# Patient Record
Sex: MALE | Race: WHITE | NOT HISPANIC OR LATINO | Employment: OTHER | URBAN - METROPOLITAN AREA
[De-identification: names, ages, dates, MRNs, and addresses within clinical notes are randomized per-mention and may not be internally consistent; named-entity substitution may affect disease eponyms.]

---

## 2023-10-24 DIAGNOSIS — I48.91 ATRIAL FIBRILLATION, UNSPECIFIED TYPE (MULTI): Primary | ICD-10-CM

## 2023-10-24 RX ORDER — METOPROLOL TARTRATE 25 MG/1
1 TABLET, FILM COATED ORAL 2 TIMES DAILY
COMMUNITY
End: 2023-10-24 | Stop reason: SDUPTHER

## 2023-10-24 RX ORDER — METOPROLOL TARTRATE 25 MG/1
25 TABLET, FILM COATED ORAL 2 TIMES DAILY
Qty: 180 TABLET | Refills: 0 | Status: SHIPPED | OUTPATIENT
Start: 2023-10-24 | End: 2024-01-22 | Stop reason: SDUPTHER

## 2023-12-13 DIAGNOSIS — I10 PRIMARY HYPERTENSION: Primary | ICD-10-CM

## 2023-12-13 RX ORDER — LOSARTAN POTASSIUM 50 MG/1
50 TABLET ORAL DAILY
COMMUNITY
Start: 2022-06-15 | End: 2023-12-13 | Stop reason: SDUPTHER

## 2023-12-13 RX ORDER — LOSARTAN POTASSIUM 50 MG/1
50 TABLET ORAL DAILY
Qty: 90 TABLET | Refills: 3 | Status: SHIPPED | OUTPATIENT
Start: 2023-12-13

## 2024-01-22 DIAGNOSIS — I48.91 ATRIAL FIBRILLATION, UNSPECIFIED TYPE (MULTI): ICD-10-CM

## 2024-01-23 RX ORDER — METOPROLOL TARTRATE 25 MG/1
25 TABLET, FILM COATED ORAL 2 TIMES DAILY
Qty: 180 TABLET | Refills: 1 | Status: SHIPPED | OUTPATIENT
Start: 2024-01-23 | End: 2024-07-21

## 2024-06-24 ENCOUNTER — APPOINTMENT (OUTPATIENT)
Dept: CARDIOLOGY | Facility: CLINIC | Age: 79
End: 2024-06-24
Payer: MEDICARE

## 2024-07-01 ENCOUNTER — APPOINTMENT (OUTPATIENT)
Dept: CARDIOLOGY | Facility: CLINIC | Age: 79
End: 2024-07-01
Payer: MEDICARE

## 2024-07-01 VITALS
BODY MASS INDEX: 26.51 KG/M2 | HEIGHT: 69 IN | TEMPERATURE: 97.3 F | WEIGHT: 179 LBS | HEART RATE: 59 BPM | SYSTOLIC BLOOD PRESSURE: 114 MMHG | DIASTOLIC BLOOD PRESSURE: 64 MMHG

## 2024-07-01 DIAGNOSIS — I10 ESSENTIAL HYPERTENSION: ICD-10-CM

## 2024-07-01 DIAGNOSIS — E78.2 MIXED HYPERLIPIDEMIA: ICD-10-CM

## 2024-07-01 DIAGNOSIS — Z95.1 S/P CABG X 3: ICD-10-CM

## 2024-07-01 DIAGNOSIS — I25.10 ATHEROSCLEROSIS OF NATIVE CORONARY ARTERY OF NATIVE HEART WITHOUT ANGINA PECTORIS: Primary | ICD-10-CM

## 2024-07-01 PROBLEM — G89.29 CHRONIC BACK PAIN: Status: ACTIVE | Noted: 2024-07-01

## 2024-07-01 PROBLEM — N18.31 STAGE 3A CHRONIC KIDNEY DISEASE (MULTI): Status: ACTIVE | Noted: 2023-07-13

## 2024-07-01 PROBLEM — F32.1 MAJOR DEPRESSIVE DISORDER, SINGLE EPISODE, MODERATE (MULTI): Status: ACTIVE | Noted: 2023-07-13

## 2024-07-01 PROBLEM — K21.9 GERD WITHOUT ESOPHAGITIS: Status: ACTIVE | Noted: 2023-07-13

## 2024-07-01 PROBLEM — E11.42 DIABETIC PERIPHERAL NEUROPATHY ASSOCIATED WITH TYPE 2 DIABETES MELLITUS (MULTI): Status: ACTIVE | Noted: 2023-07-13

## 2024-07-01 PROBLEM — M47.817 LUMBOSACRAL SPONDYLOSIS WITHOUT MYELOPATHY: Status: ACTIVE | Noted: 2022-09-06

## 2024-07-01 PROBLEM — M48.062 SPINAL STENOSIS, LUMBAR REGION, WITH NEUROGENIC CLAUDICATION: Status: ACTIVE | Noted: 2024-06-11

## 2024-07-01 PROBLEM — N40.0 HYPERPLASIA OF PROSTATE WITHOUT LOWER URINARY TRACT SYMPTOMS (LUTS): Status: ACTIVE | Noted: 2023-07-13

## 2024-07-01 PROBLEM — M54.9 CHRONIC BACK PAIN: Status: ACTIVE | Noted: 2024-07-01

## 2024-07-01 PROBLEM — G25.0 ESSENTIAL TREMOR: Status: ACTIVE | Noted: 2023-07-13

## 2024-07-01 PROCEDURE — 4004F PT TOBACCO SCREEN RCVD TLK: CPT | Performed by: INTERNAL MEDICINE

## 2024-07-01 PROCEDURE — 99213 OFFICE O/P EST LOW 20 MIN: CPT | Performed by: INTERNAL MEDICINE

## 2024-07-01 PROCEDURE — 3078F DIAST BP <80 MM HG: CPT | Performed by: INTERNAL MEDICINE

## 2024-07-01 PROCEDURE — 93000 ELECTROCARDIOGRAM COMPLETE: CPT | Performed by: INTERNAL MEDICINE

## 2024-07-01 PROCEDURE — 1159F MED LIST DOCD IN RCRD: CPT | Performed by: INTERNAL MEDICINE

## 2024-07-01 PROCEDURE — 1157F ADVNC CARE PLAN IN RCRD: CPT | Performed by: INTERNAL MEDICINE

## 2024-07-01 PROCEDURE — 1160F RVW MEDS BY RX/DR IN RCRD: CPT | Performed by: INTERNAL MEDICINE

## 2024-07-01 PROCEDURE — 3074F SYST BP LT 130 MM HG: CPT | Performed by: INTERNAL MEDICINE

## 2024-07-01 RX ORDER — ATORVASTATIN CALCIUM 40 MG/1
40 TABLET, FILM COATED ORAL DAILY
COMMUNITY

## 2024-07-01 RX ORDER — NITROGLYCERIN 0.4 MG/1
0.4 TABLET SUBLINGUAL EVERY 5 MIN PRN
COMMUNITY

## 2024-07-01 RX ORDER — NAPROXEN SODIUM 220 MG/1
81 TABLET, FILM COATED ORAL
COMMUNITY
Start: 2024-05-06

## 2024-07-01 RX ORDER — LANOLIN ALCOHOL/MO/W.PET/CERES
400 CREAM (GRAM) TOPICAL DAILY
COMMUNITY

## 2024-07-01 RX ORDER — METFORMIN HYDROCHLORIDE 1000 MG/1
1000 TABLET ORAL
COMMUNITY
Start: 2024-05-06

## 2024-07-01 RX ORDER — GABAPENTIN 600 MG/1
1200 TABLET ORAL 2 TIMES DAILY
COMMUNITY
Start: 2024-05-06 | End: 2025-05-06

## 2024-07-01 RX ORDER — FAMOTIDINE 40 MG/1
40 TABLET, FILM COATED ORAL DAILY
COMMUNITY
Start: 2024-05-06

## 2024-07-01 RX ORDER — MULTIVIT-MIN/IRON FUM/FOLIC AC 7.5 MG-4
1 TABLET ORAL
COMMUNITY
Start: 2024-05-06 | End: 2025-05-06

## 2024-07-01 RX ORDER — MIRTAZAPINE 30 MG/1
30 TABLET, FILM COATED ORAL NIGHTLY
COMMUNITY
Start: 2024-05-06

## 2024-07-01 ASSESSMENT — ENCOUNTER SYMPTOMS
RESPIRATORY NEGATIVE: 1
CARDIOVASCULAR NEGATIVE: 1
PSYCHIATRIC NEGATIVE: 1
NEUROLOGICAL NEGATIVE: 1
BACK PAIN: 1
DIARRHEA: 1

## 2024-07-01 NOTE — PROGRESS NOTES
Patient:  Edis Vogt  YOB: 1945  MRN: [id number]       Chief Complaint/Active Symptoms:       Edis Vogt is a 79 y.o. male who returns today for cardiac follow-up.    Here for annual follow-up. Having issues with back pain and had some injections about 3 weeks ago and doing better.     No chest pain or discomfort, shortness of breath, orthopnea, PND, or edema. No palpitations, dizziness or lightheadedness.     No questions or concerns today.     Review of Systems   Respiratory: Negative.     Cardiovascular: Negative.    Gastrointestinal:  Positive for diarrhea (chronic diarrhea).   Genitourinary: Negative.    Musculoskeletal:  Positive for back pain.   Neurological: Negative.    Psychiatric/Behavioral: Negative.     All other systems reviewed and are negative.      Objective:     Vitals:    07/01/24 1152   BP: 114/64   Pulse: 59   Temp: 36.3 °C (97.3 °F)       Vitals:    07/01/24 1152   Weight: 81.2 kg (179 lb)       Allergies:     No Known Allergies       Medications:     Current Outpatient Medications   Medication Instructions    aspirin 81 mg, oral, Daily RT    atorvastatin (LIPITOR) 40 mg, oral, Daily    famotidine (PEPCID) 40 mg, oral, Daily    gabapentin (NEURONTIN) 1,200 mg, oral, 2 times daily    losartan (COZAAR) 50 mg, oral, Daily    magnesium oxide (MAG-OX) 400 mg, oral, Daily    metFORMIN (GLUCOPHAGE) 1,000 mg, oral, 2 times daily (morning and late afternoon)    metoprolol tartrate (LOPRESSOR) 25 mg, oral, 2 times daily    mirtazapine (REMERON) 30 mg, oral, Nightly    multivitamin with minerals tablet 1 tablet, oral, Daily RT    nitroglycerin (NITROSTAT) 0.4 mg, sublingual, Every 5 min PRN    psyllium (Metamucil) 3.4 gram packet 1 packet, oral, Daily       Physical Examination:   GENERAL:  Well developed, well nourished, in no acute distress.  HEENT: NC AT, EOMI with anicteric sclera  NECK:  reduced ROM, no JVD, no bruit.  CHEST:  Symmetric and nontender.  LUNGS:  Clear to  auscultation bilaterally, normal respiratory effort.  HEART:  PMI is nondisplaced. RRR with normal S1 and S2, no S3, no mumur or rub. No carotid or abdominal bruits  ABDOMEN: Soft, NT, ND without palpable organomegaly or bruits  EXTREMITIES:  Warm with good color, no clubbing or cyanosis.  There is no edema noted.  PERIPHERAL VASCULAR:  Pulses present and equally palpable; 2+ throughout, DP 1+  MUSCULOSKELETAL: OA changes  NEURO/PSYCH:  Alert and oriented times three with approppriate behavior and responses. Nonfocal motor examination with normal gait and ambulation  Lymph: No significant palpable lymphadenopathy  Skin: no rash or lesions on exposed skin or reported. Thickened toenails    Lab:     CBC:   Lab Results   Component Value Date    WBC 5.5 06/08/2022    RBC 3.89 (L) 06/08/2022    HGB 11.8 (L) 06/08/2022    HCT 34.3 (L) 06/08/2022     06/08/2022        CMP:    Lab Results   Component Value Date     06/08/2022    K 3.7 06/08/2022     06/08/2022    CO2 22 06/08/2022    BUN 17 06/08/2022    CREATININE 1.37 (H) 06/08/2022    GLUCOSE 160 (H) 06/08/2022    CALCIUM 8.7 06/08/2022       Magnesium:    Lab Results   Component Value Date    MG 1.70 06/07/2022       Lipid Profile:    Lab Results   Component Value Date    TRIG 67 01/11/2022    HDL 32.0 (A) 01/11/2022       TSH:    Lab Results   Component Value Date    TSH 2.71 06/07/2022       BNP:   Lab Results   Component Value Date    BNP 24 06/07/2022        PT/INR:    Lab Results   Component Value Date    PROTIME 13.8 (H) 06/07/2022    INR 1.2 (H) 06/07/2022       HgBA1c:    Lab Results   Component Value Date    HGBA1C 6.6 (A) 01/11/2022       BMP:  Lab Results   Component Value Date     06/08/2022     06/07/2022     01/11/2022    K 3.7 06/08/2022    K 4.3 06/07/2022    K 4.9 01/11/2022     06/08/2022     06/07/2022     01/11/2022    CO2 22 06/08/2022    CO2 26 06/07/2022    CO2 31 01/11/2022    BUN 17  06/08/2022    BUN 22 06/07/2022    BUN 21 01/11/2022    CREATININE 1.37 (H) 06/08/2022    CREATININE 1.63 (H) 06/07/2022    CREATININE 1.59 (H) 01/11/2022       Cardiac Enzymes:    Lab Results   Component Value Date    TROPHS 7 06/07/2022    TROPHS 7 06/07/2022    TROPHS 7 06/07/2022       Hepatic Function Panel:    Lab Results   Component Value Date    ALKPHOS 61 06/07/2022    ALT 15 06/07/2022    AST 20 06/07/2022    PROT 7.5 06/07/2022    BILITOT 0.8 06/07/2022     Patient with recent labs via Epic Chart and [facility] that show mild CKD that is stable, no anemia, and LDL cholesterol < 55.     Diagnostic Studies:     No echocardiogram results found for the past 12 months    No nuclear medicine results found for the past 12 months  Last nuclear stress 7/2020 normal perfusion but abnormal EKG, EF 73%  No valid procedures specified.    EKG:   SB, low voltage QRS, delayed R wave progression.     Radiology:     No orders to display         ASSESSMENT     Problem List Items Addressed This Visit       Essential hypertension    Relevant Orders    ECG 12 lead (Clinic Performed) (Completed)    Atherosclerosis of coronary artery of native heart without angina pectoris - Primary    Relevant Medications    nitroglycerin (Nitrostat) 0.4 mg SL tablet    Mixed hyperlipidemia    S/P CABG x 3       PLAN     1.  Coronary artery disease stable without angina pectoris and history of coronary artery bypass surgery.  Patient is stable without angina with a low risk nuclear stress test from 4 years ago.  Will continue conservative medical therapy and risk factor modification.  2.  Benign essential hypertension.  Blood pressures are well-controlled on his current medical regimen.  3.  Mixed hyperlipidemia.  LDL cholesterol is at goal and he is tolerating his medications without any problems.  Would continue the same regimen.  4.  Tobacco weight status.  The patient is a non-smoker and is close to his ideal body weight.    I will see the  patient in follow-up in a year sooner if he has any problems or concerns.  In the absence of any symptoms we will consider stress test next year for risk stratification.

## 2024-07-16 DIAGNOSIS — I48.91 ATRIAL FIBRILLATION, UNSPECIFIED TYPE (MULTI): ICD-10-CM

## 2024-07-16 RX ORDER — METOPROLOL TARTRATE 25 MG/1
25 TABLET, FILM COATED ORAL 2 TIMES DAILY
Qty: 180 TABLET | Refills: 3 | Status: SHIPPED | OUTPATIENT
Start: 2024-07-16 | End: 2025-07-16

## 2024-10-23 ENCOUNTER — APPOINTMENT (OUTPATIENT)
Dept: CARDIOLOGY | Facility: HOSPITAL | Age: 79
End: 2024-10-23
Payer: MEDICARE

## 2024-10-23 ENCOUNTER — APPOINTMENT (OUTPATIENT)
Dept: RADIOLOGY | Facility: HOSPITAL | Age: 79
End: 2024-10-23
Payer: MEDICARE

## 2024-10-23 ENCOUNTER — HOSPITAL ENCOUNTER (OUTPATIENT)
Facility: HOSPITAL | Age: 79
Setting detail: OBSERVATION
Discharge: HOME | End: 2024-10-25
Attending: EMERGENCY MEDICINE | Admitting: STUDENT IN AN ORGANIZED HEALTH CARE EDUCATION/TRAINING PROGRAM
Payer: MEDICARE

## 2024-10-23 DIAGNOSIS — R40.4 TRANSIENT ALTERATION OF AWARENESS: Primary | ICD-10-CM

## 2024-10-23 DIAGNOSIS — Z95.1 S/P CABG X 3: ICD-10-CM

## 2024-10-23 DIAGNOSIS — G45.9 TRANSIENT CEREBRAL ISCHEMIA, UNSPECIFIED TYPE: ICD-10-CM

## 2024-10-23 DIAGNOSIS — R35.0 URINARY FREQUENCY: ICD-10-CM

## 2024-10-23 DIAGNOSIS — R41.0 DISORIENTATION: ICD-10-CM

## 2024-10-23 PROBLEM — R41.82 ALTERED MENTAL STATUS: Status: ACTIVE | Noted: 2024-10-23

## 2024-10-23 LAB
ALBUMIN SERPL BCP-MCNC: 3.9 G/DL (ref 3.4–5)
ALP SERPL-CCNC: 78 U/L (ref 33–136)
ALT SERPL W P-5'-P-CCNC: 17 U/L (ref 10–52)
AMPHETAMINES UR QL SCN: NORMAL
ANION GAP SERPL CALC-SCNC: 15 MMOL/L (ref 10–20)
APPEARANCE UR: CLEAR
AST SERPL W P-5'-P-CCNC: 18 U/L (ref 9–39)
BARBITURATES UR QL SCN: NORMAL
BASOPHILS # BLD AUTO: 0.03 X10*3/UL (ref 0–0.1)
BASOPHILS NFR BLD AUTO: 0.5 %
BENZODIAZ UR QL SCN: NORMAL
BILIRUB SERPL-MCNC: 0.8 MG/DL (ref 0–1.2)
BILIRUB UR STRIP.AUTO-MCNC: NEGATIVE MG/DL
BNP SERPL-MCNC: 116 PG/ML (ref 0–99)
BUN SERPL-MCNC: 14 MG/DL (ref 6–23)
BZE UR QL SCN: NORMAL
CALCIUM SERPL-MCNC: 9.2 MG/DL (ref 8.6–10.3)
CANNABINOIDS UR QL SCN: NORMAL
CARDIAC TROPONIN I PNL SERPL HS: 5 NG/L (ref 0–20)
CARDIAC TROPONIN I PNL SERPL HS: 6 NG/L (ref 0–20)
CHLORIDE SERPL-SCNC: 104 MMOL/L (ref 98–107)
CHOLEST SERPL-MCNC: 109 MG/DL (ref 0–199)
CHOLESTEROL/HDL RATIO: 3.7
CO2 SERPL-SCNC: 23 MMOL/L (ref 21–32)
COLOR UR: YELLOW
CREAT SERPL-MCNC: 1.26 MG/DL (ref 0.5–1.3)
EGFRCR SERPLBLD CKD-EPI 2021: 59 ML/MIN/1.73M*2
EOSINOPHIL # BLD AUTO: 0.04 X10*3/UL (ref 0–0.4)
EOSINOPHIL NFR BLD AUTO: 0.6 %
ERYTHROCYTE [DISTWIDTH] IN BLOOD BY AUTOMATED COUNT: 12.3 % (ref 11.5–14.5)
ETHANOL SERPL-MCNC: <10 MG/DL
FENTANYL+NORFENTANYL UR QL SCN: NORMAL
GLUCOSE BLD MANUAL STRIP-MCNC: 124 MG/DL (ref 74–99)
GLUCOSE BLD MANUAL STRIP-MCNC: 167 MG/DL (ref 74–99)
GLUCOSE SERPL-MCNC: 171 MG/DL (ref 74–99)
GLUCOSE UR STRIP.AUTO-MCNC: NORMAL MG/DL
HCT VFR BLD AUTO: 38.4 % (ref 41–52)
HDLC SERPL-MCNC: 29.4 MG/DL
HGB BLD-MCNC: 12.9 G/DL (ref 13.5–17.5)
HOLD SPECIMEN: NORMAL
IMM GRANULOCYTES # BLD AUTO: 0.02 X10*3/UL (ref 0–0.5)
IMM GRANULOCYTES NFR BLD AUTO: 0.3 % (ref 0–0.9)
KETONES UR STRIP.AUTO-MCNC: ABNORMAL MG/DL
LDLC SERPL CALC-MCNC: 60 MG/DL
LEUKOCYTE ESTERASE UR QL STRIP.AUTO: NEGATIVE
LYMPHOCYTES # BLD AUTO: 1.06 X10*3/UL (ref 0.8–3)
LYMPHOCYTES NFR BLD AUTO: 16.7 %
MCH RBC QN AUTO: 30.3 PG (ref 26–34)
MCHC RBC AUTO-ENTMCNC: 33.6 G/DL (ref 32–36)
MCV RBC AUTO: 90 FL (ref 80–100)
METHADONE UR QL SCN: NORMAL
MONOCYTES # BLD AUTO: 0.42 X10*3/UL (ref 0.05–0.8)
MONOCYTES NFR BLD AUTO: 6.6 %
MUCOUS THREADS #/AREA URNS AUTO: NORMAL /LPF
NEUTROPHILS # BLD AUTO: 4.78 X10*3/UL (ref 1.6–5.5)
NEUTROPHILS NFR BLD AUTO: 75.3 %
NITRITE UR QL STRIP.AUTO: NEGATIVE
NON HDL CHOLESTEROL: 80 MG/DL (ref 0–149)
NRBC BLD-RTO: 0 /100 WBCS (ref 0–0)
OPIATES UR QL SCN: NORMAL
OXYCODONE+OXYMORPHONE UR QL SCN: NORMAL
PCP UR QL SCN: NORMAL
PH UR STRIP.AUTO: 7 [PH]
PLATELET # BLD AUTO: 214 X10*3/UL (ref 150–450)
POTASSIUM SERPL-SCNC: 3.9 MMOL/L (ref 3.5–5.3)
PROT SERPL-MCNC: 6.3 G/DL (ref 6.4–8.2)
PROT UR STRIP.AUTO-MCNC: ABNORMAL MG/DL
RBC # BLD AUTO: 4.26 X10*6/UL (ref 4.5–5.9)
RBC # UR STRIP.AUTO: NEGATIVE /UL
RBC #/AREA URNS AUTO: NORMAL /HPF
SODIUM SERPL-SCNC: 138 MMOL/L (ref 136–145)
SP GR UR STRIP.AUTO: 1.02
TRIGL SERPL-MCNC: 100 MG/DL (ref 0–149)
TSH SERPL-ACNC: 1.84 MIU/L (ref 0.44–3.98)
UROBILINOGEN UR STRIP.AUTO-MCNC: ABNORMAL MG/DL
VLDL: 20 MG/DL (ref 0–40)
WBC # BLD AUTO: 6.4 X10*3/UL (ref 4.4–11.3)
WBC #/AREA URNS AUTO: NORMAL /HPF
WBC CLUMPS #/AREA URNS AUTO: NORMAL /HPF

## 2024-10-23 PROCEDURE — 82077 ASSAY SPEC XCP UR&BREATH IA: CPT

## 2024-10-23 PROCEDURE — 70547 MR ANGIOGRAPHY NECK W/O DYE: CPT

## 2024-10-23 PROCEDURE — 82746 ASSAY OF FOLIC ACID SERUM: CPT | Mod: STJLAB

## 2024-10-23 PROCEDURE — 93010 ELECTROCARDIOGRAM REPORT: CPT | Performed by: INTERNAL MEDICINE

## 2024-10-23 PROCEDURE — 82607 VITAMIN B-12: CPT | Mod: STJLAB

## 2024-10-23 PROCEDURE — 36415 COLL VENOUS BLD VENIPUNCTURE: CPT | Performed by: EMERGENCY MEDICINE

## 2024-10-23 PROCEDURE — 70450 CT HEAD/BRAIN W/O DYE: CPT

## 2024-10-23 PROCEDURE — 84153 ASSAY OF PSA TOTAL: CPT | Mod: STJLAB

## 2024-10-23 PROCEDURE — 70544 MR ANGIOGRAPHY HEAD W/O DYE: CPT | Mod: 59

## 2024-10-23 PROCEDURE — 85025 COMPLETE CBC W/AUTO DIFF WBC: CPT | Performed by: EMERGENCY MEDICINE

## 2024-10-23 PROCEDURE — 2500000004 HC RX 250 GENERAL PHARMACY W/ HCPCS (ALT 636 FOR OP/ED)

## 2024-10-23 PROCEDURE — 71045 X-RAY EXAM CHEST 1 VIEW: CPT

## 2024-10-23 PROCEDURE — G0378 HOSPITAL OBSERVATION PER HR: HCPCS

## 2024-10-23 PROCEDURE — 96372 THER/PROPH/DIAG INJ SC/IM: CPT

## 2024-10-23 PROCEDURE — 70450 CT HEAD/BRAIN W/O DYE: CPT | Performed by: RADIOLOGY

## 2024-10-23 PROCEDURE — 84484 ASSAY OF TROPONIN QUANT: CPT | Performed by: EMERGENCY MEDICINE

## 2024-10-23 PROCEDURE — 99285 EMERGENCY DEPT VISIT HI MDM: CPT | Performed by: EMERGENCY MEDICINE

## 2024-10-23 PROCEDURE — 93005 ELECTROCARDIOGRAM TRACING: CPT

## 2024-10-23 PROCEDURE — 71045 X-RAY EXAM CHEST 1 VIEW: CPT | Performed by: RADIOLOGY

## 2024-10-23 PROCEDURE — 82947 ASSAY GLUCOSE BLOOD QUANT: CPT | Mod: 59

## 2024-10-23 PROCEDURE — 99285 EMERGENCY DEPT VISIT HI MDM: CPT

## 2024-10-23 PROCEDURE — 83880 ASSAY OF NATRIURETIC PEPTIDE: CPT

## 2024-10-23 PROCEDURE — 84443 ASSAY THYROID STIM HORMONE: CPT

## 2024-10-23 PROCEDURE — 2500000001 HC RX 250 WO HCPCS SELF ADMINISTERED DRUGS (ALT 637 FOR MEDICARE OP)

## 2024-10-23 PROCEDURE — 83036 HEMOGLOBIN GLYCOSYLATED A1C: CPT | Mod: STJLAB

## 2024-10-23 PROCEDURE — 81001 URINALYSIS AUTO W/SCOPE: CPT | Performed by: EMERGENCY MEDICINE

## 2024-10-23 PROCEDURE — 70551 MRI BRAIN STEM W/O DYE: CPT

## 2024-10-23 PROCEDURE — 84075 ASSAY ALKALINE PHOSPHATASE: CPT | Performed by: EMERGENCY MEDICINE

## 2024-10-23 PROCEDURE — 80307 DRUG TEST PRSMV CHEM ANLYZR: CPT

## 2024-10-23 PROCEDURE — 83718 ASSAY OF LIPOPROTEIN: CPT

## 2024-10-23 RX ORDER — POLYETHYLENE GLYCOL 3350 17 G/17G
17 POWDER, FOR SOLUTION ORAL DAILY
Status: DISCONTINUED | OUTPATIENT
Start: 2024-10-23 | End: 2024-10-25 | Stop reason: HOSPADM

## 2024-10-23 RX ORDER — FAMOTIDINE 20 MG/1
40 TABLET, FILM COATED ORAL DAILY
Status: DISCONTINUED | OUTPATIENT
Start: 2024-10-23 | End: 2024-10-25 | Stop reason: HOSPADM

## 2024-10-23 RX ORDER — ASPIRIN 81 MG/1
81 TABLET ORAL DAILY
Status: DISCONTINUED | OUTPATIENT
Start: 2024-10-23 | End: 2024-10-25 | Stop reason: HOSPADM

## 2024-10-23 RX ORDER — METOPROLOL TARTRATE 25 MG/1
25 TABLET, FILM COATED ORAL 2 TIMES DAILY
Status: DISCONTINUED | OUTPATIENT
Start: 2024-10-23 | End: 2024-10-25 | Stop reason: HOSPADM

## 2024-10-23 RX ORDER — TAMSULOSIN HYDROCHLORIDE 0.4 MG/1
0.4 CAPSULE ORAL DAILY
Status: DISCONTINUED | OUTPATIENT
Start: 2024-10-23 | End: 2024-10-25 | Stop reason: HOSPADM

## 2024-10-23 RX ORDER — HYDRALAZINE HYDROCHLORIDE 25 MG/1
25 TABLET, FILM COATED ORAL EVERY 6 HOURS PRN
Status: DISCONTINUED | OUTPATIENT
Start: 2024-10-25 | End: 2024-10-25 | Stop reason: HOSPADM

## 2024-10-23 RX ORDER — ENOXAPARIN SODIUM 100 MG/ML
40 INJECTION SUBCUTANEOUS EVERY 24 HOURS
Status: DISCONTINUED | OUTPATIENT
Start: 2024-10-23 | End: 2024-10-25 | Stop reason: HOSPADM

## 2024-10-23 RX ORDER — DEXTROSE 50 % IN WATER (D50W) INTRAVENOUS SYRINGE
25
Status: DISCONTINUED | OUTPATIENT
Start: 2024-10-23 | End: 2024-10-25 | Stop reason: HOSPADM

## 2024-10-23 RX ORDER — MIRTAZAPINE 30 MG/1
30 TABLET, FILM COATED ORAL NIGHTLY
Status: DISCONTINUED | OUTPATIENT
Start: 2024-10-23 | End: 2024-10-25 | Stop reason: HOSPADM

## 2024-10-23 RX ORDER — DEXTROSE 50 % IN WATER (D50W) INTRAVENOUS SYRINGE
12.5
Status: DISCONTINUED | OUTPATIENT
Start: 2024-10-23 | End: 2024-10-25 | Stop reason: HOSPADM

## 2024-10-23 RX ORDER — INSULIN LISPRO 100 [IU]/ML
0-10 INJECTION, SOLUTION INTRAVENOUS; SUBCUTANEOUS
Status: DISCONTINUED | OUTPATIENT
Start: 2024-10-23 | End: 2024-10-25 | Stop reason: HOSPADM

## 2024-10-23 RX ORDER — HYDRALAZINE HYDROCHLORIDE 20 MG/ML
10 INJECTION INTRAMUSCULAR; INTRAVENOUS
Status: DISCONTINUED | OUTPATIENT
Start: 2024-10-23 | End: 2024-10-25 | Stop reason: HOSPADM

## 2024-10-23 RX ORDER — LABETALOL HYDROCHLORIDE 5 MG/ML
10 INJECTION, SOLUTION INTRAVENOUS EVERY 10 MIN PRN
Status: DISCONTINUED | OUTPATIENT
Start: 2024-10-23 | End: 2024-10-25 | Stop reason: HOSPADM

## 2024-10-23 RX ORDER — GABAPENTIN 600 MG/1
1200 TABLET ORAL 2 TIMES DAILY
Status: DISCONTINUED | OUTPATIENT
Start: 2024-10-23 | End: 2024-10-25 | Stop reason: HOSPADM

## 2024-10-23 RX ORDER — TAMSULOSIN HYDROCHLORIDE 0.4 MG/1
0.4 CAPSULE ORAL DAILY
COMMUNITY

## 2024-10-23 RX ORDER — LOSARTAN POTASSIUM 50 MG/1
50 TABLET ORAL DAILY
Status: DISCONTINUED | OUTPATIENT
Start: 2024-10-23 | End: 2024-10-25 | Stop reason: HOSPADM

## 2024-10-23 RX ORDER — ATORVASTATIN CALCIUM 80 MG/1
80 TABLET, FILM COATED ORAL NIGHTLY
COMMUNITY

## 2024-10-23 RX ORDER — ATORVASTATIN CALCIUM 80 MG/1
80 TABLET, FILM COATED ORAL NIGHTLY
Status: DISCONTINUED | OUTPATIENT
Start: 2024-10-23 | End: 2024-10-25 | Stop reason: HOSPADM

## 2024-10-23 SDOH — ECONOMIC STABILITY: TRANSPORTATION INSECURITY: IN THE PAST 12 MONTHS, HAS LACK OF TRANSPORTATION KEPT YOU FROM MEDICAL APPOINTMENTS OR FROM GETTING MEDICATIONS?: NO

## 2024-10-23 SDOH — ECONOMIC STABILITY: HOUSING INSECURITY: IN THE LAST 12 MONTHS, WAS THERE A TIME WHEN YOU WERE NOT ABLE TO PAY THE MORTGAGE OR RENT ON TIME?: NO

## 2024-10-23 SDOH — SOCIAL STABILITY: SOCIAL INSECURITY: WITHIN THE LAST YEAR, HAVE YOU BEEN HUMILIATED OR EMOTIONALLY ABUSED IN OTHER WAYS BY YOUR PARTNER OR EX-PARTNER?: NO

## 2024-10-23 SDOH — ECONOMIC STABILITY: FOOD INSECURITY: WITHIN THE PAST 12 MONTHS, YOU WORRIED THAT YOUR FOOD WOULD RUN OUT BEFORE YOU GOT THE MONEY TO BUY MORE.: NEVER TRUE

## 2024-10-23 SDOH — ECONOMIC STABILITY: INCOME INSECURITY: IN THE PAST 12 MONTHS HAS THE ELECTRIC, GAS, OIL, OR WATER COMPANY THREATENED TO SHUT OFF SERVICES IN YOUR HOME?: NO

## 2024-10-23 SDOH — SOCIAL STABILITY: SOCIAL INSECURITY: WERE YOU ABLE TO COMPLETE ALL THE BEHAVIORAL HEALTH SCREENINGS?: YES

## 2024-10-23 SDOH — SOCIAL STABILITY: SOCIAL INSECURITY
WITHIN THE LAST YEAR, HAVE YOU BEEN KICKED, HIT, SLAPPED, OR OTHERWISE PHYSICALLY HURT BY YOUR PARTNER OR EX-PARTNER?: NO

## 2024-10-23 SDOH — SOCIAL STABILITY: SOCIAL INSECURITY: WITHIN THE LAST YEAR, HAVE YOU BEEN AFRAID OF YOUR PARTNER OR EX-PARTNER?: NO

## 2024-10-23 SDOH — HEALTH STABILITY: MENTAL HEALTH
DO YOU FEEL STRESS - TENSE, RESTLESS, NERVOUS, OR ANXIOUS, OR UNABLE TO SLEEP AT NIGHT BECAUSE YOUR MIND IS TROUBLED ALL THE TIME - THESE DAYS?: NOT AT ALL

## 2024-10-23 SDOH — SOCIAL STABILITY: SOCIAL INSECURITY: DOES ANYONE TRY TO KEEP YOU FROM HAVING/CONTACTING OTHER FRIENDS OR DOING THINGS OUTSIDE YOUR HOME?: NO

## 2024-10-23 SDOH — SOCIAL STABILITY: SOCIAL INSECURITY
WITHIN THE LAST YEAR, HAVE YOU BEEN RAPED OR FORCED TO HAVE ANY KIND OF SEXUAL ACTIVITY BY YOUR PARTNER OR EX-PARTNER?: NO

## 2024-10-23 SDOH — ECONOMIC STABILITY: HOUSING INSECURITY: AT ANY TIME IN THE PAST 12 MONTHS, WERE YOU HOMELESS OR LIVING IN A SHELTER (INCLUDING NOW)?: NO

## 2024-10-23 SDOH — ECONOMIC STABILITY: FOOD INSECURITY: HOW HARD IS IT FOR YOU TO PAY FOR THE VERY BASICS LIKE FOOD, HOUSING, MEDICAL CARE, AND HEATING?: NOT HARD AT ALL

## 2024-10-23 SDOH — SOCIAL STABILITY: SOCIAL INSECURITY: HAS ANYONE EVER THREATENED TO HURT YOUR FAMILY OR YOUR PETS?: NO

## 2024-10-23 SDOH — SOCIAL STABILITY: SOCIAL INSECURITY: DO YOU FEEL ANYONE HAS EXPLOITED OR TAKEN ADVANTAGE OF YOU FINANCIALLY OR OF YOUR PERSONAL PROPERTY?: NO

## 2024-10-23 SDOH — SOCIAL STABILITY: SOCIAL INSECURITY: ABUSE: ADULT

## 2024-10-23 SDOH — SOCIAL STABILITY: SOCIAL INSECURITY: ARE YOU OR HAVE YOU BEEN THREATENED OR ABUSED PHYSICALLY, EMOTIONALLY, OR SEXUALLY BY ANYONE?: NO

## 2024-10-23 SDOH — ECONOMIC STABILITY: FOOD INSECURITY: WITHIN THE PAST 12 MONTHS, THE FOOD YOU BOUGHT JUST DIDN'T LAST AND YOU DIDN'T HAVE MONEY TO GET MORE.: NEVER TRUE

## 2024-10-23 SDOH — SOCIAL STABILITY: SOCIAL INSECURITY: ARE THERE ANY APPARENT SIGNS OF INJURIES/BEHAVIORS THAT COULD BE RELATED TO ABUSE/NEGLECT?: NO

## 2024-10-23 SDOH — ECONOMIC STABILITY: HOUSING INSECURITY: IN THE PAST 12 MONTHS, HOW MANY TIMES HAVE YOU MOVED WHERE YOU WERE LIVING?: 0

## 2024-10-23 SDOH — SOCIAL STABILITY: SOCIAL INSECURITY: DO YOU FEEL UNSAFE GOING BACK TO THE PLACE WHERE YOU ARE LIVING?: NO

## 2024-10-23 SDOH — SOCIAL STABILITY: SOCIAL INSECURITY: HAVE YOU HAD THOUGHTS OF HARMING ANYONE ELSE?: NO

## 2024-10-23 SDOH — SOCIAL STABILITY: SOCIAL INSECURITY: HAVE YOU HAD ANY THOUGHTS OF HARMING ANYONE ELSE?: NO

## 2024-10-23 ASSESSMENT — PATIENT HEALTH QUESTIONNAIRE - PHQ9
SUM OF ALL RESPONSES TO PHQ9 QUESTIONS 1 & 2: 0
1. LITTLE INTEREST OR PLEASURE IN DOING THINGS: NOT AT ALL
2. FEELING DOWN, DEPRESSED OR HOPELESS: NOT AT ALL

## 2024-10-23 ASSESSMENT — ACTIVITIES OF DAILY LIVING (ADL)
JUDGMENT_ADEQUATE_SAFELY_COMPLETE_DAILY_ACTIVITIES: NO
HEARING - LEFT EAR: HEARING AID
ADEQUATE_TO_COMPLETE_ADL: YES
ASSISTIVE_DEVICE: NONE;CANE
PATIENT'S MEMORY ADEQUATE TO SAFELY COMPLETE DAILY ACTIVITIES?: NO
LACK_OF_TRANSPORTATION: NO
WALKS IN HOME: INDEPENDENT
FEEDING YOURSELF: INDEPENDENT
BATHING: INDEPENDENT
GROOMING: INDEPENDENT
LACK_OF_TRANSPORTATION: NO
TOILETING: INDEPENDENT
HEARING - RIGHT EAR: HEARING AID
DRESSING YOURSELF: INDEPENDENT

## 2024-10-23 ASSESSMENT — LIFESTYLE VARIABLES
HAVE PEOPLE ANNOYED YOU BY CRITICIZING YOUR DRINKING: NO
HOW OFTEN DO YOU HAVE A DRINK CONTAINING ALCOHOL: NEVER
AUDIT-C TOTAL SCORE: 0
HAVE YOU EVER FELT YOU SHOULD CUT DOWN ON YOUR DRINKING: NO
EVER HAD A DRINK FIRST THING IN THE MORNING TO STEADY YOUR NERVES TO GET RID OF A HANGOVER: NO
EVER FELT BAD OR GUILTY ABOUT YOUR DRINKING: NO
SKIP TO QUESTIONS 9-10: 1
PRESCIPTION_ABUSE_PAST_12_MONTHS: NO
TOTAL SCORE: 0
AUDIT-C TOTAL SCORE: 0
HOW MANY STANDARD DRINKS CONTAINING ALCOHOL DO YOU HAVE ON A TYPICAL DAY: PATIENT DOES NOT DRINK
HOW OFTEN DO YOU HAVE 6 OR MORE DRINKS ON ONE OCCASION: NEVER
SUBSTANCE_ABUSE_PAST_12_MONTHS: NO

## 2024-10-23 ASSESSMENT — COGNITIVE AND FUNCTIONAL STATUS - GENERAL
DAILY ACTIVITIY SCORE: 24
MOBILITY SCORE: 24
MOBILITY SCORE: 24
DAILY ACTIVITIY SCORE: 24
PATIENT BASELINE BEDBOUND: NO

## 2024-10-23 ASSESSMENT — PAIN SCALES - GENERAL
PAINLEVEL_OUTOF10: 0 - NO PAIN

## 2024-10-23 ASSESSMENT — COLUMBIA-SUICIDE SEVERITY RATING SCALE - C-SSRS
2. HAVE YOU ACTUALLY HAD ANY THOUGHTS OF KILLING YOURSELF?: NO
6. HAVE YOU EVER DONE ANYTHING, STARTED TO DO ANYTHING, OR PREPARED TO DO ANYTHING TO END YOUR LIFE?: NO
1. IN THE PAST MONTH, HAVE YOU WISHED YOU WERE DEAD OR WISHED YOU COULD GO TO SLEEP AND NOT WAKE UP?: NO

## 2024-10-23 ASSESSMENT — PAIN - FUNCTIONAL ASSESSMENT: PAIN_FUNCTIONAL_ASSESSMENT: 0-10

## 2024-10-23 NOTE — ED PROVIDER NOTES
Emergency Department Provider Note        History of Present Illness     History provided by: Patient and Family Member  Limitations to History: None  External Records Reviewed with Brief Summary: None    HPI:  Edis Vogt is a 79 y.o. male with coronary artery disease s/p CABG, hypertension, hyperlipidemia, CKD presenting for altered mental status.  Patient is brought in by his daughter, who reported that he was disoriented this morning.  This is unusual for him.  Additionally, he was very fatigued yesterday and laid in bed all day, not eating or drinking.  Patient himself is denying any complaints and states that he feels fine.  He does not think that he had been sleeping that much yesterday.  Has no complaints such as chest pain, abdominal pain, nausea vomiting, dysuria, fevers or chills, cough, weakness.    Physical Exam   Triage vitals:  T 36.6 °C (97.9 °F)  HR 78  /77  RR 18  O2 97 % None (Room air)    Physical Exam  Vitals and nursing note reviewed.   Constitutional:       General: He is not in acute distress.     Appearance: He is well-developed.   HENT:      Head: Normocephalic and atraumatic.      Right Ear: External ear normal.      Left Ear: External ear normal.      Nose: Nose normal.   Eyes:      General: No scleral icterus.     Conjunctiva/sclera: Conjunctivae normal.      Pupils: Pupils are equal, round, and reactive to light.   Cardiovascular:      Rate and Rhythm: Normal rate and regular rhythm.      Heart sounds: No murmur heard.  Pulmonary:      Effort: Pulmonary effort is normal. No respiratory distress.      Breath sounds: Normal breath sounds.   Abdominal:      Palpations: Abdomen is soft.      Tenderness: There is no abdominal tenderness.   Musculoskeletal:         General: No swelling.      Cervical back: Neck supple. No rigidity.   Skin:     General: Skin is warm and dry.   Neurological:      General: No focal deficit present.      Mental Status: He is alert and oriented to  person, place, and time.      Cranial Nerves: No cranial nerve deficit.      Sensory: No sensory deficit.      Motor: No weakness.      Gait: Gait normal.   Psychiatric:         Mood and Affect: Mood normal.          Medical Decision Making & ED Course   Medical Decision Makin y.o. male presenting for transient altered mental status.  He is oriented x 3 here and has no complaints.  He is afebrile hemodynamically stable.  His daughter feels that he is still slightly off however.  We obtain infectious and metabolic workup of his symptoms.  Will also obtain CT imaging of his head.    CBC negative for leukocytosis.  Anemia of 12.9 is close to baseline.  Chemistry panel unremarkable.  Urinalysis negative for any signs of infection or blood.  Urine drug screen negative.  Troponin negative x 2.  Chest x-ray negative.  CT head negative.    Unclear etiology of the patient's symptoms we did discuss with this with neurologist on-call Dr. Marie.  As the patient had temporary change in mentation and history of vascular disease, he does feel that the possibility of a TIA is high enough to warrant stroke workup.  This was discussed with the medicine service and patient is admitted to medicine for further management.    Fidel Rosenberg DO, PGY 4  Emergency Medicine Resident  ----       Social Determinants of Health which Significantly Impact Care: None identified     EKG Independent Interpretation: EKG not obtained    Independent Result Review and Interpretation: Relevant laboratory and radiographic results were reviewed and independently interpreted by myself.  As necessary, they are commented on in the ED Course.    Chronic conditions affecting the patient's care: As documented above in [facility]    The patient was discussed with the following consultants/services: Dr. Marie and Hospitalist/Admitting Provider who accepted the patient for admission    Care Considerations: As documented above in [facility]    ED Course:  Diagnoses as of  10/24/24 0206   Transient alteration of awareness     Disposition   As a result of their workup, the patient will require admission to the hospital.  The patient was informed of his diagnosis.  The patient was given the opportunity to ask questions and I answered them. The patient agreed to be admitted to the hospital.    Procedures   Procedures    Patient seen and discussed with ED attending physician.    Fidel Rosenberg DO  Emergency Medicine    =================Attending note===============    The patient was seen by the resident/fellow.  I have personally performed a substantive portion of the encounter.  I have seen and examined the patient; agree with the workup, evaluation, MDM,   management and diagnosis.  The care plan has been discussed with the resident; I have reviewed the resident’s note and agree with the documented findings.      This is a 79 y.o. male who presents to ER with disorientation and increasing sleep.  He has been confused.  He lives with his daughter and she states she works nights and at first she did not notice, but then she put the pieces together that he did not get out of bed yesterday.  Did not refill his water or take his pills.  He states he feels fine.  Patient is denying any complaints.  He is awake to person and place and time.  His daughter states that she told him that she was concerned about him and wanted to bring him to the hospital any was going to come outside in his underwear and did not bring his glasses or his wallet.  She states in 2022 he had a similar issue.  He does have a history of coronary artery disease, hypertension, hyperlipidemia, chronic kidney disease, cardiac bypass.    Heart is regular.  Lungs are clear.  Abdomen is soft and nontender.  Moving extremities x 4 without difficulty.    No definitive UTI.  Chemistry unremarkable.  Mild anemia.  No leukocytosis.  Toxicology screen negative.  No UTI.  Troponin negative x 2.  Chest x-ray unremarkable.  CT head  unremarkable.    Case is discussed with neurology and they did want patient brought into the hospital for further evaluation.    Case discussed with medicine and patient is admitted.      ==========================================       Fidel Rosenberg DO  Resident  10/24/24 0203

## 2024-10-23 NOTE — CARE PLAN
The patient's goals for the shift include  to go home.    The clinical goals for the shift include Neuro assessment remain unchanged    Patient neuro assessment currently negative.  He is forgetful but answers questions appropriately.  Patient cooperative with daughter at bed side.  Educated on falls precautions and plan of care.  Patient to have MRI/Mri. Check list completed.  Echo tomorrow.  Neuro consult pending.

## 2024-10-24 ENCOUNTER — APPOINTMENT (OUTPATIENT)
Dept: NEUROLOGY | Facility: HOSPITAL | Age: 79
End: 2024-10-24
Payer: MEDICARE

## 2024-10-24 ENCOUNTER — APPOINTMENT (OUTPATIENT)
Dept: CARDIOLOGY | Facility: HOSPITAL | Age: 79
End: 2024-10-24
Payer: MEDICARE

## 2024-10-24 PROBLEM — R41.82 ALTERED MENTAL STATUS: Status: RESOLVED | Noted: 2024-10-23 | Resolved: 2024-10-24

## 2024-10-24 LAB
ALBUMIN SERPL BCP-MCNC: 3.4 G/DL (ref 3.4–5)
ANION GAP SERPL CALC-SCNC: 13 MMOL/L (ref 10–20)
AORTIC VALVE MEAN GRADIENT: 6 MMHG
AORTIC VALVE PEAK VELOCITY: 1.69 M/S
ATRIAL RATE: 70 BPM
AV PEAK GRADIENT: 11.4 MMHG
AVA (PEAK VEL): 1.69 CM2
AVA (VTI): 1.41 CM2
BUN SERPL-MCNC: 13 MG/DL (ref 6–23)
CALCIUM SERPL-MCNC: 8.9 MG/DL (ref 8.6–10.3)
CHLORIDE SERPL-SCNC: 105 MMOL/L (ref 98–107)
CO2 SERPL-SCNC: 25 MMOL/L (ref 21–32)
CREAT SERPL-MCNC: 1.32 MG/DL (ref 0.5–1.3)
EGFRCR SERPLBLD CKD-EPI 2021: 56 ML/MIN/1.73M*2
EJECTION FRACTION APICAL 4 CHAMBER: 60.9
EJECTION FRACTION: 63 %
ERYTHROCYTE [DISTWIDTH] IN BLOOD BY AUTOMATED COUNT: 12.4 % (ref 11.5–14.5)
EST. AVERAGE GLUCOSE BLD GHB EST-MCNC: 151 MG/DL
FOLATE SERPL-MCNC: >24 NG/ML
GLUCOSE BLD MANUAL STRIP-MCNC: 136 MG/DL (ref 74–99)
GLUCOSE BLD MANUAL STRIP-MCNC: 158 MG/DL (ref 74–99)
GLUCOSE BLD MANUAL STRIP-MCNC: 237 MG/DL (ref 74–99)
GLUCOSE SERPL-MCNC: 123 MG/DL (ref 74–99)
HBA1C MFR BLD: 6.9 %
HCT VFR BLD AUTO: 34.2 % (ref 41–52)
HGB BLD-MCNC: 11.4 G/DL (ref 13.5–17.5)
LEFT VENTRICLE INTERNAL DIMENSION DIASTOLE: 3.5 CM (ref 3.5–6)
LEFT VENTRICULAR OUTFLOW TRACT DIAMETER: 1.9 CM
LV EJECTION FRACTION BIPLANE: 64 %
MAGNESIUM SERPL-MCNC: 1.43 MG/DL (ref 1.6–2.4)
MCH RBC QN AUTO: 30.2 PG (ref 26–34)
MCHC RBC AUTO-ENTMCNC: 33.3 G/DL (ref 32–36)
MCV RBC AUTO: 91 FL (ref 80–100)
MITRAL VALVE E/A RATIO: 0.84
MITRAL VALVE E/E' RATIO: 16.3
NRBC BLD-RTO: 0 /100 WBCS (ref 0–0)
P AXIS: 48 DEGREES
P OFFSET: 181 MS
P ONSET: 132 MS
PHOSPHATE SERPL-MCNC: 3.5 MG/DL (ref 2.5–4.9)
PLATELET # BLD AUTO: 167 X10*3/UL (ref 150–450)
POTASSIUM SERPL-SCNC: 3.8 MMOL/L (ref 3.5–5.3)
PR INTERVAL: 162 MS
PSA SERPL-MCNC: 1.94 NG/ML
Q ONSET: 213 MS
QRS COUNT: 12 BEATS
QRS DURATION: 90 MS
QT INTERVAL: 402 MS
QTC CALCULATION(BAZETT): 434 MS
QTC FREDERICIA: 423 MS
R AXIS: -11 DEGREES
RBC # BLD AUTO: 3.77 X10*6/UL (ref 4.5–5.9)
RIGHT VENTRICLE FREE WALL PEAK S': 10 CM/S
RIGHT VENTRICLE PEAK SYSTOLIC PRESSURE: 20.6 MMHG
SODIUM SERPL-SCNC: 139 MMOL/L (ref 136–145)
T AXIS: 36 DEGREES
T OFFSET: 414 MS
TRICUSPID ANNULAR PLANE SYSTOLIC EXCURSION: 1.6 CM
VENTRICULAR RATE: 70 BPM
VIT B12 SERPL-MCNC: 636 PG/ML (ref 211–911)
WBC # BLD AUTO: 5.2 X10*3/UL (ref 4.4–11.3)

## 2024-10-24 PROCEDURE — 99223 1ST HOSP IP/OBS HIGH 75: CPT

## 2024-10-24 PROCEDURE — 80069 RENAL FUNCTION PANEL: CPT

## 2024-10-24 PROCEDURE — 93306 TTE W/DOPPLER COMPLETE: CPT | Performed by: EMERGENCY MEDICINE

## 2024-10-24 PROCEDURE — 85027 COMPLETE CBC AUTOMATED: CPT

## 2024-10-24 PROCEDURE — 82947 ASSAY GLUCOSE BLOOD QUANT: CPT

## 2024-10-24 PROCEDURE — 97161 PT EVAL LOW COMPLEX 20 MIN: CPT | Mod: GP

## 2024-10-24 PROCEDURE — 2500000001 HC RX 250 WO HCPCS SELF ADMINISTERED DRUGS (ALT 637 FOR MEDICARE OP)

## 2024-10-24 PROCEDURE — 2500000004 HC RX 250 GENERAL PHARMACY W/ HCPCS (ALT 636 FOR OP/ED)

## 2024-10-24 PROCEDURE — 83735 ASSAY OF MAGNESIUM: CPT

## 2024-10-24 PROCEDURE — 95816 EEG AWAKE AND DROWSY: CPT

## 2024-10-24 PROCEDURE — 99222 1ST HOSP IP/OBS MODERATE 55: CPT | Performed by: NURSE PRACTITIONER

## 2024-10-24 PROCEDURE — 97165 OT EVAL LOW COMPLEX 30 MIN: CPT | Mod: GO | Performed by: OCCUPATIONAL THERAPIST

## 2024-10-24 PROCEDURE — G0378 HOSPITAL OBSERVATION PER HR: HCPCS

## 2024-10-24 PROCEDURE — 96372 THER/PROPH/DIAG INJ SC/IM: CPT

## 2024-10-24 PROCEDURE — 95816 EEG AWAKE AND DROWSY: CPT | Performed by: PSYCHIATRY & NEUROLOGY

## 2024-10-24 PROCEDURE — 2500000002 HC RX 250 W HCPCS SELF ADMINISTERED DRUGS (ALT 637 FOR MEDICARE OP, ALT 636 FOR OP/ED)

## 2024-10-24 PROCEDURE — 93306 TTE W/DOPPLER COMPLETE: CPT

## 2024-10-24 PROCEDURE — 2500000002 HC RX 250 W HCPCS SELF ADMINISTERED DRUGS (ALT 637 FOR MEDICARE OP, ALT 636 FOR OP/ED): Mod: MUE | Performed by: STUDENT IN AN ORGANIZED HEALTH CARE EDUCATION/TRAINING PROGRAM

## 2024-10-24 PROCEDURE — 36415 COLL VENOUS BLD VENIPUNCTURE: CPT

## 2024-10-24 RX ORDER — LANOLIN ALCOHOL/MO/W.PET/CERES
400 CREAM (GRAM) TOPICAL DAILY
Status: DISCONTINUED | OUTPATIENT
Start: 2024-10-24 | End: 2024-10-25 | Stop reason: HOSPADM

## 2024-10-24 RX ORDER — SULFAMETHOXAZOLE AND TRIMETHOPRIM 800; 160 MG/1; MG/1
1 TABLET ORAL EVERY 12 HOURS SCHEDULED
Qty: 19 TABLET | Refills: 0 | Status: SHIPPED | OUTPATIENT
Start: 2024-10-24 | End: 2024-10-25 | Stop reason: HOSPADM

## 2024-10-24 RX ORDER — SULFAMETHOXAZOLE AND TRIMETHOPRIM 800; 160 MG/1; MG/1
1 TABLET ORAL EVERY 12 HOURS SCHEDULED
Status: DISCONTINUED | OUTPATIENT
Start: 2024-10-24 | End: 2024-10-25 | Stop reason: HOSPADM

## 2024-10-24 ASSESSMENT — PAIN - FUNCTIONAL ASSESSMENT
PAIN_FUNCTIONAL_ASSESSMENT: 0-10
PAIN_FUNCTIONAL_ASSESSMENT: 0-10

## 2024-10-24 ASSESSMENT — COGNITIVE AND FUNCTIONAL STATUS - GENERAL
MOBILITY SCORE: 20
DRESSING REGULAR LOWER BODY CLOTHING: A LITTLE
HELP NEEDED FOR BATHING: A LITTLE
CLIMB 3 TO 5 STEPS WITH RAILING: A LITTLE
WALKING IN HOSPITAL ROOM: A LITTLE
WALKING IN HOSPITAL ROOM: A LITTLE
DAILY ACTIVITIY SCORE: 22
CLIMB 3 TO 5 STEPS WITH RAILING: A LITTLE
MOVING TO AND FROM BED TO CHAIR: A LITTLE
MOBILITY SCORE: 22
DAILY ACTIVITIY SCORE: 24
MOBILITY SCORE: 22
DAILY ACTIVITIY SCORE: 24
CLIMB 3 TO 5 STEPS WITH RAILING: A LITTLE
STANDING UP FROM CHAIR USING ARMS: A LITTLE
WALKING IN HOSPITAL ROOM: A LITTLE

## 2024-10-24 ASSESSMENT — PAIN SCALES - GENERAL
PAINLEVEL_OUTOF10: 0 - NO PAIN

## 2024-10-24 ASSESSMENT — ACTIVITIES OF DAILY LIVING (ADL)
LACK_OF_TRANSPORTATION: NO
ADL_ASSISTANCE: INDEPENDENT

## 2024-10-24 NOTE — PROGRESS NOTES
10/24/24 1215   Discharge Planning   Living Arrangements Family members   Support Systems Family members   Assistance Needed none   Type of Residence Private residence   Do you have animals or pets at home? Yes   Type of Animals or Pets cat   Home or Post Acute Services None   Expected Discharge Disposition Home   Does the patient need discharge transport arranged? No   Financial Resource Strain   How hard is it for you to pay for the very basics like food, housing, medical care, and heating? Not hard   Housing Stability   In the last 12 months, was there a time when you were not able to pay the mortgage or rent on time? N   At any time in the past 12 months, were you homeless or living in a shelter (including now)? N   Transportation Needs   In the past 12 months, has lack of transportation kept you from medical appointments or from getting medications? no   In the past 12 months, has lack of transportation kept you from meetings, work, or from getting things needed for daily living? No     Met with the patient and daughter at the bedside, confirmed demographics, insurance, pcp is Dr. LUIS Kelley. He lives with his daughter who is available to assist as needed. Patient is independent at home and does not use equipment. His daughter does organize his medications with a pill organizer. He is able to purchase his medications, takes as ordered, and received education. He will discharge home with no additional needs.

## 2024-10-24 NOTE — CARE PLAN
The patient's goals for the shift include      The clinical goals for the shift include Patient will remain safe and free from injury throughout shift.      Problem: General Stroke  Goal: Establish a mutual long term goal with patient by discharge  Outcome: Progressing  Goal: Demonstrate improvement in neurological exam throughout the shift  Outcome: Progressing  Goal: Maintain BP within ordered limits throughout shift  Outcome: Progressing  Goal: Participate in treatment (ie., meds, therapy) throughout shift  Outcome: Progressing  Goal: No symptoms of aspiration throughout shift  Outcome: Progressing  Goal: No symptoms of hemorrhage throughout shift  Outcome: Progressing  Goal: Controlled blood glucose throughout shift  Outcome: Progressing  Goal: Out of bed three times today  Outcome: Progressing     Problem: Pain - Adult  Goal: Verbalizes/displays adequate comfort level or baseline comfort level  Outcome: Progressing     Problem: Safety - Adult  Goal: Free from fall injury  Outcome: Progressing

## 2024-10-24 NOTE — PROGRESS NOTES
Occupational Therapy  Evaluation    Patient Name: Edis Vogt  MRN: [id number]  Today's Date: 10/24/2024  Time Calculation  Start Time: 1119  Stop Time: 1136  Time Calculation (min): 17 min  3133/3133-A    Assessment  IP OT Assessment  OT Assessment: Patient demonstrates decreased independence with self care, decreased independence with functional transfers, and decreased balance. Patient will benefit from skilled OT to address deficits.  Anticipate safe discharge to prior level of living.  Will continue to follow while in hospital.  Prognosis: Good  Barriers to Discharge: None  Evaluation/Treatment Tolerance: Patient tolerated treatment well  Medical Staff Made Aware: Yes  End of Session Communication: Bedside nurse  End of Session Patient Position: Up in chair, Alarm on (daughter present)    Plan:  Treatment Interventions: ADL retraining, Functional transfer training, Endurance training, Patient/family training  OT Frequency: 3 times per week  OT Discharge Recommendations: No OT needed after discharge  Equipment Recommended upon Discharge: Wheeled walker  OT Recommended Transfer Status: Minimal assist  OT - OK to Discharge: Yes (to next level of care when medically cleared by physician)    Subjective     Current Problem:  1. Transient alteration of awareness  Transthoracic Echo (TTE) Complete    Transthoracic Echo (TTE) Complete          General:  General  Reason for Referral: impaired cognition  Referred By: Dr. Potts  Past Medical History Relevant to Rehab: CAD, HTN, DM, hypothyroidism, pacemaker  Family/Caregiver Present: Yes (daughter)  Co-Treatment: PT  Co-Treatment Reason: discharge planning  Prior to Session Communication: Bedside nurse  Patient Position Received: Bed, 2 rail up, Alarm on  Preferred Learning Style: verbal  General Comment: Patient agreeable to therapy.    Precautions:  Hearing/Visual Limitations: wears glasses  Medical Precautions: Fall precautions      Pain:  Pain Assessment  Pain  Assessment: 0-10  0-10 (Numeric) Pain Score: 0 - No pain    Objective     Cognition:  Orientation Level: Oriented X4        Home Living:  Type of Home: House  Lives With: Adult children (lives with daughter who works nights)  Home Adaptive Equipment: Walker rolling or standard  Home Layout: Multi-level, Bed/bath upstairs, Stairs to alternate level with rails  Alternate Level Stairs-Number of Steps: ~6-9 [place] to second and third floors  Home Access: Stairs to enter with rails  Entrance Stairs-Number of Steps: 3  Bathroom Shower/Tub: Walk-in shower  Bathroom Equipment: Grab bars in shower, Shower chair with back     Prior Function:  Level of [place]: Independent with ADLs and functional transfers  Receives Help From: Family  Prior Function Comments: -falls      ADL:  LE Dressing Assistance: Minimal    Activity Tolerance:  Endurance: Tolerates 10 - 20 min exercise with multiple rests    Bed Mobility/Transfers:   Bed Mobility  Bed Mobility: Yes  Bed Mobility 1  Bed Mobility 1: Supine to sitting  Level of Assistance 1: Modified independent  Transfers  Transfer: Yes (Patient ambulated in hallway with CGA)  Transfer 1  Transfer From 1: Bed to, Stand to  Transfer to 1: Stand, Bed  Technique 1: Sit to stand, Stand to sit  Transfer Level of Assistance 1: Contact guard  Trials/Comments 1: x2 trials  Transfers 2  Transfer From 2: Stand to  Transfer to 2: Chair with arms  Technique 2: Stand to sit  Transfer Level of Assistance 2: Contact guard    Ambulation/Gait Training:  Functional Mobility  Functional Mobility Performed: Yes      Sensation:  Light Touch: No apparent deficits    Strength:  Strength Comments: WFL    Outcome Measures: [facility] Daily Activity  Putting on and taking off regular lower body clothing: A little  Bathing (including washing, rinsing, drying): A little  Putting on and taking off regular upper body clothing: None  Toileting, which includes using toilet, bedpan or urinal: None  Taking care of personal  grooming such as brushing teeth: None  Eating Meals: None  Daily Activity - Total Score: 22           EDUCATION:  Education  Individual(s) Educated: Patient  Education Provided: Fall precautons, Risk and benefits of OT discussed with patient or other  Plan of Care Discussed and Agreed Upon: yes  Patient Response to Education: Patient/Caregiver Verbalized Understanding of Information    Goals:   Encounter Problems       Encounter Problems (Active)       OT Goals       Patient will complete functional transfers with mod I. (Progressing)       Start:  10/24/24    Expected End:  11/07/24            Patient will complete LE dressing with mod I. (Progressing)       Start:  10/24/24    Expected End:  11/07/24

## 2024-10-24 NOTE — CONSULTS
"Inpatient consult to Neurology  Consult performed by: CHUNG Rios-CNP  Consult ordered by: Yusef Reyez DO          History Of Present Illness  Edis Vogt is a 79 y.o. male presenting with altered mental status. He lives with daughter who was on the phone with son in the patient's room. Daughter states that pt was normal on Monday. She checked on him Tuesday morning at 11 AM because he was still sleeping but he told her he wasn't feeling great and wanted to sleep. He slept most of day and returned to bed around 8 PM. Wednesday morning when she woke him up, pt was disoriented and confused with \"weird\" speech. While he was putting on his clothes for an appointment, he did not put on pants but was ready to leave the house. Also noted by daughter is that patient has not been taking his medications since Monday, even though she prepares his medications for him. At baseline, pt is AxO3 and ambulatory without assistance, and this remained true even during his altered episode this AM. Upon further interview, the daughter is unable to explain why but she still feels that the patient is \"off\" and that his speech is not consistent with baseline. Per the patient, he reports he did not sleep for that long and he is not confused or altered. He denies any fevers, chills, chest pain, shortness of breath, dysuria, hematuria, abdominal pain, nausea, emesis, or other symptoms at that time.     The patient was previously seen on 6/08/2022 at [facility] for altered mental status. The patient was discharged the following day with discontinuation of cyclobenzaprine which he is currently not taking.    ED Course  Vitals: /77, HR 78, RR 18, SpO2 97% on RA, T 36.6  Labs: CBC with hemoglobin 12.9, but no leukocytosis. CMP largely unremarkable. Troponins negative. UA negative for infectious etiology, but with 1+ protein. UDS negative.  Imaging: CXR negative for acute cardiopulmonary process  CT head negative for acute " "intracranial hemorrhage or mass effect  Interventions: ED discussed case with Neurology who recommended admission and stroke evaluation.    Pt seen at bedside. He was in a chair working on his laptop. Son was present and states patient does not remember how he slept last night. Pt expressed no concerns and states he feels :normal.\"     Past Medical History  Past Medical History:   Diagnosis Date    Other chest pain 02/10/2022    Chest tightness    Other conditions influencing health status 01/06/2018    History of cough    Other hypotension 02/10/2022    Hypotension due to hypovolemia    Personal history of other diseases of the circulatory system 12/30/2021    History of abnormal electrocardiography    Personal history of other diseases of the respiratory system 01/06/2018    History of influenza     Surgical History  Past Surgical History:   Procedure Laterality Date    OTHER SURGICAL HISTORY  01/06/2022    Colonoscopy    OTHER SURGICAL HISTORY  01/06/2022    Appendectomy    OTHER SURGICAL HISTORY  01/06/2022    Coronary artery bypass graft    OTHER SURGICAL HISTORY  12/30/2021    Bypass     Social History  Social History     Tobacco Use    Smoking status: Never    Smokeless tobacco: Current     Types: Chew   Substance Use Topics    Alcohol use: Not Currently    Drug use: Not Currently     Allergies  Patient has no known allergies.  Medications Prior to Admission   Medication Sig Dispense Refill Last Dose/Taking    aspirin 81 mg chewable tablet Chew 1 tablet (81 mg) once daily.   10/21/2024 Morning    mirtazapine (Remeron) 30 mg tablet Take 1 tablet (30 mg) by mouth once daily at bedtime.   10/21/2024 Bedtime    nitroglycerin (Nitrostat) 0.4 mg SL tablet Place 1 tablet (0.4 mg) under the tongue every 5 minutes if needed for chest pain.   Unknown    psyllium (Metamucil) 3.4 gram packet Take 1 packet by mouth once daily as needed (for constipation).   Past Week    atorvastatin (Lipitor) 80 mg tablet Take 1 tablet " (80 mg) by mouth once daily at bedtime.   10/21/2024 Bedtime    famotidine (Pepcid) 40 mg tablet Take 1 tablet (40 mg) by mouth once daily.   10/21/2024 Morning    gabapentin (Neurontin) 600 mg tablet Take 2 tablets (1,200 mg) by mouth twice a day.   10/21/2024    losartan (Cozaar) 50 mg tablet Take 1 tablet (50 mg) by mouth once daily. 90 tablet 3 10/21/2024 Morning    magnesium oxide (Mag-Ox) 400 mg (241.3 mg magnesium) tablet Take 1 tablet (400 mg) by mouth once daily.   10/21/2024 Morning    metFORMIN (Glucophage) 1,000 mg tablet Take 1 tablet (1,000 mg) by mouth 2 times daily (morning and late afternoon).   10/21/2024 Bedtime    metoprolol tartrate (Lopressor) 25 mg tablet Take 1 tablet (25 mg) by mouth 2 times a day. 180 tablet 3     multivitamin with minerals tablet Take 1 tablet by mouth once daily.   10/21/2024 Morning    tamsulosin (Flomax) 0.4 mg 24 hr capsule Take 1 capsule (0.4 mg) by mouth once daily.   10/21/2024 Morning       Review of Systems  Neurological Exam  Mental Status  Awake, alert and oriented to person, place and time. Speech is normal. Language is fluent with no aphasia. Attention and concentration are normal. Fund of knowledge is appropriate for level of education.    Cranial Nerves  CN II: Visual fields full to confrontation.  CN III, IV, VI: Extraocular movements intact bilaterally. Pupils equal round and reactive to light bilaterally.  CN V: Facial sensation is normal.  CN VII: Full and symmetric facial movement.  CN VIII: Hearing is normal.  CN IX, X: Palate elevates symmetrically  CN XII: Tongue midline without atrophy or fasciculations.    Motor  Normal muscle bulk throughout. Normal muscle tone. Strength is 5/5 throughout all four extremities.    Sensory  Sensation is intact to light touch, pinprick, vibration and proprioception in all four extremities.    Coordination  Right: Finger-to-nose normal.Left: Finger-to-nose normal.    Physical Exam  Eyes:      Extraocular Movements:  "Extraocular movements intact.      Pupils: Pupils are equal, round, and reactive to light.   Neurological:      Motor: Motor strength is normal.  Psychiatric:         Speech: Speech normal.       Last Recorded Vitals  Blood pressure 149/67, pulse 78, temperature 35.9 °C (96.6 °F), temperature source Temporal, resp. rate 16, height 1.753 m (5' 9\"), weight 74.8 kg (165 lb), SpO2 95%.    Relevant Results  Scheduled medications  aspirin, 81 mg, oral, Daily  atorvastatin, 80 mg, oral, Nightly  enoxaparin, 40 mg, subcutaneous, q24h  famotidine, 40 mg, oral, Daily  gabapentin, 1,200 mg, oral, BID  insulin lispro, 0-10 Units, subcutaneous, TID  [Held by provider] losartan, 50 mg, oral, Daily  magnesium oxide, 400 mg, oral, Daily  [Held by provider] metoprolol tartrate, 25 mg, oral, BID  mirtazapine, 30 mg, oral, Nightly  perflutren lipid microspheres, 0.5-10 mL of dilution, intravenous, Once in imaging  perflutren protein A microsphere, 0.5 mL, intravenous, Once in imaging  polyethylene glycol, 17 g, oral, Daily  sulfamethoxazole-trimethoprim, 1 tablet, oral, q12h GARRETT  sulfur hexafluoride microsphr, 2 mL, intravenous, Once in imaging  tamsulosin, 0.4 mg, oral, Daily      Continuous medications     PRN medications  PRN medications: dextrose, dextrose, glucagon, glucagon, hydrALAZINE **FOLLOWED BY** [START ON 10/25/2024] hydrALAZINE, labetaloL, oxygen  Results for orders placed or performed during the hospital encounter of 10/23/24 (from the past 24 hours)   POCT GLUCOSE   Result Value Ref Range    POCT Glucose 124 (H) 74 - 99 mg/dL   ECG 12 Lead   Result Value Ref Range    Ventricular Rate 70 BPM    Atrial Rate 70 BPM    OK Interval 162 ms    QRS Duration 90 ms    QT Interval 402 ms    QTC Calculation(Bazett) 434 ms    P Axis 48 degrees    R Axis -11 degrees    T Axis 36 degrees    QRS Count 12 beats    Q Onset 213 ms    P Onset 132 ms    P Offset 181 ms    T Offset 414 ms    QTC Fredericia 423 ms   CBC   Result Value Ref " Range    WBC 5.2 4.4 - 11.3 x10*3/uL    nRBC 0.0 0.0 - 0.0 /100 WBCs    RBC 3.77 (L) 4.50 - 5.90 x10*6/uL    Hemoglobin 11.4 (L) 13.5 - 17.5 g/dL    Hematocrit 34.2 (L) 41.0 - 52.0 %    MCV 91 80 - 100 fL    MCH 30.2 26.0 - 34.0 pg    MCHC 33.3 32.0 - 36.0 g/dL    RDW 12.4 11.5 - 14.5 %    Platelets 167 150 - 450 x10*3/uL   Renal Function Panel   Result Value Ref Range    Glucose 123 (H) 74 - 99 mg/dL    Sodium 139 136 - 145 mmol/L    Potassium 3.8 3.5 - 5.3 mmol/L    Chloride 105 98 - 107 mmol/L    Bicarbonate 25 21 - 32 mmol/L    Anion Gap 13 10 - 20 mmol/L    Urea Nitrogen 13 6 - 23 mg/dL    Creatinine 1.32 (H) 0.50 - 1.30 mg/dL    eGFR 56 (L) >60 mL/min/1.73m*2    Calcium 8.9 8.6 - 10.3 mg/dL    Phosphorus 3.5 2.5 - 4.9 mg/dL    Albumin 3.4 3.4 - 5.0 g/dL   Magnesium   Result Value Ref Range    Magnesium 1.43 (L) 1.60 - 2.40 mg/dL   POCT GLUCOSE   Result Value Ref Range    POCT Glucose 136 (H) 74 - 99 mg/dL   Transthoracic Echo (TTE) Complete   Result Value Ref Range    AV pk albert 1.69 m/s    LV Biplane EF 64 %    LVOT diam 1.90 cm    MV E/A ratio 0.84     MV avg E/e' ratio 16.30     Tricuspid annular plane systolic excursion 1.6 cm    AV mn grad 6.0 mmHg    LV EF 63 %    RV free wall pk S' 10.00 cm/s    RVSP 20.6 mmHg    LVIDd 3.50 cm    Aortic Valve Area by Continuity of Peak Velocity 1.69 cm2    AV pk grad 11.4 mmHg    Aortic Valve Area by Continuity of VTI 1.41 cm2    LV A4C EF 60.9    POCT GLUCOSE   Result Value Ref Range    POCT Glucose 158 (H) 74 - 99 mg/dL        I have personally reviewed the following imaging results ECG 12 Lead    Result Date: 10/24/2024  Normal sinus rhythm Normal ECG When compared with ECG of 07-JUN-2022 10:37, Nonspecific T wave abnormality no longer evident in Lateral leads    Transthoracic Echo (TTE) Complete    Result Date: 10/24/2024            [facility] [address] [facility], [facility] [address]    Tel [phone] Fax [phone] TRANSTHORACIC ECHOCARDIOGRAM REPORT  Patient Name:      CAITLIN LUNA      Reading Physician:    [id number] Olegario Howard MD Study Date:        10/24/2024          Ordering Provider:    [id number] SUSANA SHEPHERD MRN/PID:           [id number]            Fellow: Accession#:        [id number]        Nurse:                Sonia Asher Date of Birth/Age: 1945 / 79 years Sonographer:          Hayley Giron RDVAUGHN Gender:            M                   Additional Staff: Height:            175.26 cm           Admit Date: Weight:            74.84 kg            Admission Status:     Observation -                                                              Priority discharge BSA / BMI:         1.90 m2 / 24.37     Department Location:  [facility] Echo Lab                    kg/m2 Blood Pressure: 163 /77 mmHg Study Type:    TRANSTHORACIC ECHO (TTE) COMPLETE Diagnosis/ICD: Transient alteration of awareness-R40.4 Indication:    stroke rule out CPT Codes:     Echo Complete w Full Doppler-73072 Patient History: CABG:              CABG x 3. Diabetes:          Yes Pertinent History: HTN. Study Detail: The following Echo studies were performed: 2D, M-Mode, Doppler and               color flow. Image quality for this study is adequate. Agitated               saline used as a contrast agent for intraseptal flow evaluation.  PHYSICIAN INTERPRETATION: Left Ventricle: Left ventricular ejection fraction is normal, by visual estimate at 60-65%. There are no regional wall motion abnormalities. The left ventricular cavity size is normal. Spectral Doppler shows an impaired relaxation pattern of left ventricular diastolic filling. Left Atrium: The left atrium is normal in size. Right Ventricle: The right ventricle is normal in size. There is normal right ventricular global systolic function. Right Atrium: The right atrium is normal in size. Aortic Valve: The aortic valve is  trileaflet. There is mild to moderate aortic valve cusp calcification. There is evidence of mildly elevated transaortic gradients consistent with sclerosis of the aortic valve Patients with severe VHD should be evaluated by a multidisciplinary Heart Valve Team when intervention is considered. (Class I, Level of Evidence: C) Consultation with or referral to a Heart Valve Center of Excellence is reasonable when discussing treatment options for 1) asymptomatic patients with severe VHD, 2) patients who may benefit from valve repair versus valve replacement, or 3) patients with multiple comorbidities for whom valve intervention is considered. (Class IIb, Level of Evidence: C) Circulation. 2014 Enmanuel 10;129(23):7880-39. The aortic valve dimensionless index is 0.52. There is no evidence of aortic valve regurgitation. The peak instantaneous gradient of the aortic valve is 11.4 mmHg. The mean gradient of the aortic valve is 6.0 mmHg. Mitral Valve: The mitral valve is normal in structure. There is no evidence of mitral valve regurgitation. Tricuspid Valve: The tricuspid valve is structurally normal. There is trace tricuspid regurgitation. The Doppler estimated RVSP is within normal limits at 20.6 mmHg. Pulmonic Valve: The pulmonic valve is structurally normal. There is mild pulmonic valve regurgitation. Pericardium: No pericardial effusion noted. Aorta: The aortic root is normal. There is no dilatation of the ascending aorta. There is no dilatation of the aortic root. Pulmonary Artery: The Doppler estimated pulmonary artery diastolic pressure is 8.7 mmHg. Systemic Veins: The inferior vena cava appears normal in size, with IVC inspiratory collapse less than 50%.  CONCLUSIONS:  1. Left ventricular ejection fraction is normal, by visual estimate at 60-65%.  2. Spectral Doppler shows an impaired relaxation pattern of left ventricular diastolic filling.  3. There is normal right ventricular global systolic function.  4. Right  ventricular within normal limits.  5. Aortic valve sclerosis. QUANTITATIVE DATA SUMMARY:  2D MEASUREMENTS:           Normal Ranges: LAs:             3.60 cm   (2.7-4.0cm) IVSd:            1.17 cm   (0.6-1.1cm) LVPWd:           1.00 cm   (0.6-1.1cm) LVIDd:           3.50 cm   (3.9-5.9cm) LVIDs:           2.27 cm LV Mass Index:   61.2 g/m2 LV % FS          35.1 %  LA VOLUME:                  Normal Ranges: LA Area A4C:      9.5 cm2 LA Area A2C:      6.7 cm2 LA Volume Index:  8.4 ml/m2 LA Vol A4C:       18.0 ml LA Vol A2C:       11.0 ml LA Vol Index BSA: 7.6 ml/m2 LA Vol BP:        16.0 ml  M-MODE MEASUREMENTS:         Normal Ranges: Ao Root:             3.00 cm (2.0-3.7cm) AoV Exc:             1.40 cm (1.5-2.5cm)  AORTA MEASUREMENTS:         Normal Ranges: AoV Exc:            1.40 cm (1.5-2.5cm) Ao Sinus, d:        3.40 cm (2.1-3.5cm) Ao STJ, d:          2.70 cm (1.7-3.4cm) Asc Ao, d:          3.00 cm (2.1-3.4cm)  LV SYSTOLIC FUNCTION BY 2D PLANIMETRY (MOD):                      Normal Ranges: EF-A4C View:    61 % (>=55%) EF-A2C View:    67 % EF-Biplane:     64 % EF-Visual:      63 % LV EF Reported: 63 %  LV DIASTOLIC FUNCTION:           Normal Ranges: MV Peak E:             0.81 m/s  (0.7-1.2 m/s) MV Peak A:             0.96 m/s  (0.42-0.7 m/s) E/A Ratio:             0.84      (1.0-2.2) MV e'                  0.046 m/s (>8.0) MV lateral e'          0.05 m/s MV medial e'           0.04 m/s E/e' Ratio:            17.66     (<8.0)  MITRAL VALVE:          Normal Ranges: MV DT:        306 msec (150-240msec)  AORTIC VALVE:                      Normal Ranges: AoV Vmax:                1.69 m/s  (<=1.7m/s) AoV Peak P.4 mmHg (<20mmHg) AoV Mean P.0 mmHg  (1.7-11.5mmHg) LVOT Max Chadwick:            1.06 m/s  (<=1.1m/s) AoV VTI:                 34.50 cm  (18-25cm) LVOT VTI:                18.10 cm LVOT Diameter:           1.90 cm   (1.8-2.4cm) AoV Area, VTI:           1.41 cm2  (2.5-5.5cm2) AoV  Area,Vmax:           1.69 cm2  (2.5-4.5cm2) AoV Dimensionless Index: 0.52  RIGHT VENTRICLE: RV Basal 3.50 cm RV Mid   2.80 cm RV Major 6.5 cm TAPSE:   16.0 mm RV s'    0.10 m/s  TRICUSPID VALVE/RVSP:          Normal Ranges: Peak TR Velocity:     2.10 m/s RV Syst Pressure:     21 mmHg  (< 30mmHg)  PULMONIC VALVE:          Normal Ranges: PV Accel Time:  155 msec (>120ms) PIEDV:          1.19 m/s PADP:           8.7 mmHg  [id number] Olegario Howard MD Electronically signed on 10/24/2024 at 1:06:12 PM  ** Final **     MR brain wo IV contrast    Result Date: 10/24/2024  Interpreted By:  Uzair Sheriff, STUDY: MR BRAIN WO IV CONTRAST; MR ANGIO NECK WO IV CONTRAST; MR ANGIO HEAD WO IV CONTRAST;  10/23/2024 9:18 pm   INDICATION: Signs/Symptoms:stroke r/o.   COMPARISON: Head CT 10/23/2024.   ACCESSION NUMBER(S): [id number]; [id number]; [id number]   ORDERING CLINICIAN: SUSANA SHEPHERD   TECHNIQUE: Axial T2, FLAIR, DWI, gradient echo T2 and sagittal and coronal T1 weighted images of brain were acquired. Noncontrast time-of-flight MR angiogram of the ieqdch-[id number]-Vzrvij vessels was obtained with MIP reformats. Noncontrast time of flight MR angiogram of the neck vessels was obtained with MIP reformats.   FINDINGS: Brain MRI: There is no evidence of acute infarction. There are no extra-axial collections. There is no mass lesion and no midline shift. There is no hydrocephalus. Motion degraded study with mild generalized cerebral volume loss and associated ventricular and sulcal enlargement. No significant focal parenchymal abnormality.   Paranasal Sinuses and Mastoids: Visualized paranasal sinuses are well aerated. The mastoid air cells are clear. The orbits are grossly normal.   MRA of the brain: Anterior circulation: The intracranial portions of the internal carotid, middle cerebral, and anterior cerebral arteries are patent, without significant focal stenosis. There is a normal anterior communicating artery.   Posterior circulation: The  intracranial portions of the vertebral arteries are patent. The basilar artery is patent without focal stenosis. The visualized portions of proximal posterior cerebral arteries are patent without focal stenosis.     MRA of the neck: Common carotid arteries: Patent. Left internal carotid: No significant stenosis. Right internal carotid: Motion degraded study with suggestion of mild atherosclerotic plaque at the right carotid bifurcation not well evaluated. Cervical vertebral arteries: Patent cervical vertebral arteries without focal stenosis.       No evidence of acute infarct, intracranial mass effect or midline shift. Generalized cerebral volume loss and associated ventricular and sulcal enlargement. No evidence of large vessel occlusion or critical stenosis. Motion degraded study with questionable atherosclerotic plaque at the right internal carotid artery origin. Follow-up nonemergent CT angiogram may be obtained as clinically indicated.   Signed by: Uzair Sheriff 10/24/2024 1:06 AM Dictation workstation:   [id number]    MR angio head wo IV contrast    Result Date: 10/24/2024  Interpreted By:  Uzair Sheriff, STUDY: MR BRAIN WO IV CONTRAST; MR ANGIO NECK WO IV CONTRAST; MR ANGIO HEAD WO IV CONTRAST;  10/23/2024 9:18 pm   INDICATION: Signs/Symptoms:stroke r/o.   COMPARISON: Head CT 10/23/2024.   ACCESSION NUMBER(S): [id number]; [id number]; [id number]   ORDERING CLINICIAN: SUSANA SHEHPERD   TECHNIQUE: Axial T2, FLAIR, DWI, gradient echo T2 and sagittal and coronal T1 weighted images of brain were acquired. Noncontrast time-of-flight MR angiogram of the dlsbnf-zw-Hhkjco vessels was obtained with MIP reformats. Noncontrast time of flight MR angiogram of the neck vessels was obtained with MIP reformats.   FINDINGS: Brain MRI: There is no evidence of acute infarction. There are no extra-axial collections. There is no mass lesion and no midline shift. There is no hydrocephalus. Motion degraded study with mild  generalized cerebral volume loss and associated ventricular and sulcal enlargement. No significant focal parenchymal abnormality.   Paranasal Sinuses and Mastoids: Visualized paranasal sinuses are well aerated. The mastoid air cells are clear. The orbits are grossly normal.   MRA of the brain: Anterior circulation: The intracranial portions of the internal carotid, middle cerebral, and anterior cerebral arteries are patent, without significant focal stenosis. There is a normal anterior communicating artery.   Posterior circulation: The intracranial portions of the vertebral arteries are patent. The basilar artery is patent without focal stenosis. The visualized portions of proximal posterior cerebral arteries are patent without focal stenosis.     MRA of the neck: Common carotid arteries: Patent. Left internal carotid: No significant stenosis. Right internal carotid: Motion degraded study with suggestion of mild atherosclerotic plaque at the right carotid bifurcation not well evaluated. Cervical vertebral arteries: Patent cervical vertebral arteries without focal stenosis.       No evidence of acute infarct, intracranial mass effect or midline shift. Generalized cerebral volume loss and associated ventricular and sulcal enlargement. No evidence of large vessel occlusion or critical stenosis. Motion degraded study with questionable atherosclerotic plaque at the right internal carotid artery origin. Follow-up nonemergent CT angiogram may be obtained as clinically indicated.   Signed by: Uzair Sheriff 10/24/2024 1:06 AM Dictation workstation:   [id number]    MR angio neck wo IV contrast    Result Date: 10/24/2024  Interpreted By:  Uzair Sheriff, STUDY: MR BRAIN WO IV CONTRAST; MR ANGIO NECK WO IV CONTRAST; MR ANGIO HEAD WO IV CONTRAST;  10/23/2024 9:18 pm   INDICATION: Signs/Symptoms:stroke r/o.   COMPARISON: Head CT 10/23/2024.   ACCESSION NUMBER(S): [id number]; [id number]; [id number]   ORDERING CLINICIAN:  SUSANA SHEPHERD   TECHNIQUE: Axial T2, FLAIR, DWI, gradient echo T2 and sagittal and coronal T1 weighted images of brain were acquired. Noncontrast time-of-flight MR angiogram of the abpzhb-nd-Sthqvx vessels was obtained with MIP reformats. Noncontrast time of flight MR angiogram of the neck vessels was obtained with MIP reformats.   FINDINGS: Brain MRI: There is no evidence of acute infarction. There are no extra-axial collections. There is no mass lesion and no midline shift. There is no hydrocephalus. Motion degraded study with mild generalized cerebral volume loss and associated ventricular and sulcal enlargement. No significant focal parenchymal abnormality.   Paranasal Sinuses and Mastoids: Visualized paranasal sinuses are well aerated. The mastoid air cells are clear. The orbits are grossly normal.   MRA of the brain: Anterior circulation: The intracranial portions of the internal carotid, middle cerebral, and anterior cerebral arteries are patent, without significant focal stenosis. There is a normal anterior communicating artery.   Posterior circulation: The intracranial portions of the vertebral arteries are patent. The basilar artery is patent without focal stenosis. The visualized portions of proximal posterior cerebral arteries are patent without focal stenosis.     MRA of the neck: Common carotid arteries: Patent. Left internal carotid: No significant stenosis. Right internal carotid: Motion degraded study with suggestion of mild atherosclerotic plaque at the right carotid bifurcation not well evaluated. Cervical vertebral arteries: Patent cervical vertebral arteries without focal stenosis.       No evidence of acute infarct, intracranial mass effect or midline shift. Generalized cerebral volume loss and associated ventricular and sulcal enlargement. No evidence of large vessel occlusion or critical stenosis. Motion degraded study with questionable atherosclerotic plaque at the right internal carotid  artery origin. Follow-up nonemergent CT angiogram may be obtained as clinically indicated.   Signed by: Uzair Sheriff 10/24/2024 1:06 AM Dictation workstation:   [id number]    XR chest 1 view    Result Date: 10/23/2024  Interpreted By:  Jodi Healy, STUDY: XR CHEST 1 VIEW;  10/23/2024 1:24 pm   INDICATION: Signs/Symptoms:altered mentation.     COMPARISON: 06/07/2022   ACCESSION NUMBER(S): [id number]   ORDERING CLINICIAN: SUSANA SHEPHERD   FINDINGS: Two views were obtained. No focal infiltrate, pleural effusion or pneumothorax identified. The cardiac silhouette is within normal limits for size with overlying sternotomy wires and surgical clips again seen.       No acute cardiopulmonary process radiographically.   MACRO: None.   Signed by: Jodi Healy 10/23/2024 1:35 PM Dictation workstation:   [id number]    CT head wo IV contrast    Result Date: 10/23/2024  Interpreted By:  Jodi Healy, STUDY: CT HEAD WO IV CONTRAST;  10/23/2024 1:07 pm   INDICATION: Signs/Symptoms:Confusion x 36 hours.     COMPARISON: 06/07/2022   ACCESSION NUMBER(S): [id number]   ORDERING CLINICIAN: EMIGDIO PARISI   TECHNIQUE: Unenhanced CT images of the head were obtained.   FINDINGS: The ventricles, cisterns and sulci are enlarged, consistent with diffuse volume loss. There are areas of nonspecific white matter hypodensity, which are probably age related or microvascular in nature. These findings are similar to the previous exam. There is no acute intracranial hemorrhage, mass effect or midline shift. No extraaxial fluid collection.   No focal calvarial lesion.   Visualized paranasal sinuses are clear.           No acute intracranial hemorrhage or mass-effect.   MACRO: None.   Signed by: Jodi Healy 10/23/2024 1:34 PM Dictation workstation:   [id number]  .      Assessment/Plan   Assessment & Plan      Unexplained episode of confusion- associated with no evidence of acute CNS pathology on brain imaging or physical examination. MRI brain  and MRA unremarkable and reviewed with DR. Marie. Pt appears to have returned to baseline.    Recommend:    Routine EEG to complete work up  Discussed driving precautions  Discharge with [facility]  Telemetry  Neuro checks every 4 hours.  Follow up with PCP after discharge.    Case/plan discussed and pt seen with Dr. Marie.           I spent 45 minutes in the professional and overall care of this patient.      Odalys Simms, APRN-CNP

## 2024-10-24 NOTE — DISCHARGE INSTRUCTIONS
Please follow-up with your primary care provider within 7 days for hospital follow-up and to schedule a CT angiogram to assess a possible atherosclerotic plaque seen on MRA imaging.  You will also need to follow up with Urology for your urinary hesitancy and frequency. Please call to make this appointment.  Please follow-up with the neurologist for further management.    Medication Changes:  none    Please continue taking your medications as prescribed    If you have any new or worsening symptoms, seek medical attention.    Thank you for allowing us to participate in your medical care!    [place] [place] [facility] [place]

## 2024-10-24 NOTE — PROGRESS NOTES
Caitlin Vogt is a 79 y.o. male on day 0 of admission presenting with Altered mental status.    Subjective   NAEON. HDS on RA. Patient reports that he is feeling the same as yesterday. Denies any fevers, chills, confusion, chest pain, shortness of breath, dysuria, or other acute complaints. Does note some urinary hesitancy and frequency.    Objective     Last Recorded Vitals  /58 (Patient Position: Lying)   Pulse 93   Temp 36.3 °C (97.3 °F) (Temporal)   Resp 18   Wt 74.8 kg (165 lb)   SpO2 99%   Intake/Output last 3 Shifts:    Intake/Output Summary (Last 24 hours) at 10/24/2024 1432  Last data filed at 10/23/2024 1800  Gross per 24 hour   Intake 250 ml   Output --   Net 250 ml     Admission Weight  Weight: 74.8 kg (165 lb) (10/23/24 0941)    Daily Weight  10/23/24 : 74.8 kg (165 lb)    Image Results  Transthoracic Echo (TTE) Complete              [facility] [place]  [address] [address] [facility] [address]     Tel [phone] Fax [phone]    TRANSTHORACIC ECHOCARDIOGRAM REPORT    Patient Name:      CAITLIN VOGT      Reading Physician:    [id number] Olegario Howard MD  Study Date:        10/24/2024          Ordering Provider:    [id number] SUSANA SHEPHERD  MRN/PID:           [id number]            Fellow:  Accession#:        [id number]        Nurse:                Sonia Asher  Date of Birth/Age: 1945 / 79 years Sonographer:          Hayley Giron RDCS  Gender:            M                   Additional Staff:  Height:            175.26 cm           Admit Date:  Weight:            74.84 kg            Admission Status:     Observation -                                                               Priority discharge  BSA / BMI:         1.90 m2 / 24.37     Department Location:  [facility] Echo Lab                     kg/m2  Blood Pressure: 163 /77 mmHg    Study Type:    TRANSTHORACIC ECHO (TTE)  COMPLETE  Diagnosis/ICD: Transient alteration of awareness-R40.4  Indication:    stroke rule out  CPT Codes:     Echo Complete w Full Doppler-[id number]    Patient History:  CABG:              CABG x 3.  Diabetes:          Yes  Pertinent History: HTN.    Study Detail: The following Echo studies were performed: 2D, M-Mode, Doppler and                color flow. Image quality for this study is adequate. Agitated                saline used as a contrast agent for intraseptal flow evaluation.       PHYSICIAN INTERPRETATION:  Left Ventricle: Left ventricular ejection fraction is normal, by visual estimate at 60-65%. There are no regional wall motion abnormalities. The left ventricular cavity size is normal. Spectral Doppler shows an impaired relaxation pattern of left ventricular diastolic filling.  Left Atrium: The left atrium is normal in size.  Right Ventricle: The right ventricle is normal in size. There is normal right ventricular global systolic function.  Right Atrium: The right atrium is normal in size.  Aortic Valve: The aortic valve is trileaflet. There is mild to moderate aortic valve cusp calcification. There is evidence of mildly elevated transaortic gradients consistent with sclerosis of the aortic valve  Patients with severe VHD should be evaluated by a multidisciplinary Heart Valve Team when intervention  is considered. (Class I, Level of Evidence: C)    Consultation with or referral to a Heart Valve Center of Excellence is reasonable when discussing  treatment options for 1) asymptomatic patients with severe VHD, 2) patients who may benefit from valve repair versus valve replacement, or 3) patients with multiple comorbidities for whom valve intervention  is considered. (Class IIb, Level of Evidence: C)    Circulation. 2014 Enmanuel 10;129(23):2440-36. The aortic valve dimensionless index is 0.52. There is no evidence of aortic valve regurgitation. The peak instantaneous gradient of the aortic valve is 11.4 mmHg.  The mean gradient of the aortic valve is 6.0 mmHg.  Mitral Valve: The mitral valve is normal in structure. There is no evidence of mitral valve regurgitation.  Tricuspid Valve: The tricuspid valve is structurally normal. There is trace tricuspid regurgitation. The Doppler estimated RVSP is within normal limits at 20.6 mmHg.  Pulmonic Valve: The pulmonic valve is structurally normal. There is mild pulmonic valve regurgitation.  Pericardium: No pericardial effusion noted.  Aorta: The aortic root is normal. There is no dilatation of the ascending aorta. There is no dilatation of the aortic root.  Pulmonary Artery: The Doppler estimated pulmonary artery diastolic pressure is 8.7 mmHg.  Systemic Veins: The inferior vena cava appears normal in size, with IVC inspiratory collapse less than 50%.       CONCLUSIONS:   1. Left ventricular ejection fraction is normal, by visual estimate at 60-65%.   2. Spectral Doppler shows an impaired relaxation pattern of left ventricular diastolic filling.   3. There is normal right ventricular global systolic function.   4. Right ventricular within normal limits.   5. Aortic valve sclerosis.    QUANTITATIVE DATA SUMMARY:     2D MEASUREMENTS:           Normal Ranges:  LAs:             3.60 cm   (2.7-4.0cm)  IVSd:            1.17 cm   (0.6-1.1cm)  LVPWd:           1.00 cm   (0.6-1.1cm)  LVIDd:           3.50 cm   (3.9-5.9cm)  LVIDs:           2.27 cm  LV Mass Index:   61.2 g/m2  LV % FS          35.1 %       LA VOLUME:                  Normal Ranges:  LA Area A4C:      9.5 cm2  LA Area A2C:      6.7 cm2  LA Volume Index:  8.4 ml/m2  LA Vol A4C:       18.0 ml  LA Vol A2C:       11.0 ml  LA Vol Index BSA: 7.6 ml/m2  LA Vol BP:        16.0 ml       M-MODE MEASUREMENTS:         Normal Ranges:  Ao Root:             3.00 cm (2.0-3.7cm)  AoV Exc:             1.40 cm (1.5-2.5cm)       AORTA MEASUREMENTS:         Normal Ranges:  AoV Exc:            1.40 cm (1.5-2.5cm)  Ao Sinus, d:        3.40 cm  (2.1-3.5cm)  Ao STJ, d:          2.70 cm (1.7-3.4cm)  Asc Ao, d:          3.00 cm (2.1-3.4cm)       LV SYSTOLIC FUNCTION BY 2D PLANIMETRY (MOD):                       Normal Ranges:  EF-A4C View:    61 % (>=55%)  EF-A2C View:    67 %  EF-Biplane:     64 %  EF-Visual:      63 %  LV EF Reported: 63 %       LV DIASTOLIC FUNCTION:           Normal Ranges:  MV Peak E:             0.81 m/s  (0.7-1.2 m/s)  MV Peak A:             0.96 m/s  (0.42-0.7 m/s)  E/A Ratio:             0.84      (1.0-2.2)  MV e'                  0.046 m/s (>8.0)  MV lateral e'          0.05 m/s  MV medial e'           0.04 m/s  E/e' Ratio:            17.66     (<8.0)       MITRAL VALVE:          Normal Ranges:  MV DT:        306 msec (150-240msec)       AORTIC VALVE:                      Normal Ranges:  AoV Vmax:                1.69 m/s  (<=1.7m/s)  AoV Peak P.4 mmHg (<20mmHg)  AoV Mean P.0 mmHg  (1.7-11.5mmHg)  LVOT Max Chadwick:            1.06 m/s  (<=1.1m/s)  AoV VTI:                 34.50 cm  (18-25cm)  LVOT VTI:                18.10 cm  LVOT Diameter:           1.90 cm   (1.8-2.4cm)  AoV Area, VTI:           1.41 cm2  (2.5-5.5cm2)  AoV Area,Vmax:           1.69 cm2  (2.5-4.5cm2)  AoV Dimensionless Index: 0.52       RIGHT VENTRICLE:  RV Basal 3.50 cm  RV Mid   2.80 cm  RV Major 6.5 cm  TAPSE:   16.0 mm  RV s'    0.10 m/s       TRICUSPID VALVE/RVSP:          Normal Ranges:  Peak TR Velocity:     2.10 m/s  RV Syst Pressure:     21 mmHg  (< 30mmHg)       PULMONIC VALVE:          Normal Ranges:  PV Accel Time:  155 msec (>120ms)  PIEDV:          1.19 m/s  PADP:           8.7 mmHg       [id number] Olegario Howard MD  Electronically signed on 10/24/2024 at 1:06:12 PM       ** Final **  MR brain wo IV contrast, MR angio head wo IV contrast, MR angio neck wo IV contrast  Narrative: Interpreted By:  Uzair Sheriff,   STUDY:  MR BRAIN WO IV CONTRAST; MR ANGIO NECK WO IV CONTRAST; MR ANGIO HEAD  WO IV CONTRAST;  10/23/2024 9:18 pm       INDICATION:  Signs/Symptoms:stroke r/o.      COMPARISON:  Head CT 10/23/2024.      ACCESSION NUMBER(S):  [id number]; [id number]; [id number]      ORDERING CLINICIAN:  SUSANA SHEPHERD      TECHNIQUE:  Axial T2, FLAIR, DWI, gradient echo T2 and sagittal and coronal T1  weighted images of brain were acquired. Noncontrast time-of-flight MR  angiogram of the mmmrut-zk-Jfqsvz vessels was obtained with MIP  reformats. Noncontrast time of flight MR angiogram of the neck  vessels was obtained with MIP reformats.      FINDINGS:  Brain MRI:  There is no evidence of acute infarction. There are no extra-axial  collections. There is no mass lesion and no midline shift. There is  no hydrocephalus. Motion degraded study with mild generalized  cerebral volume loss and associated ventricular and sulcal  enlargement. No significant focal parenchymal abnormality.      Paranasal Sinuses and Mastoids: Visualized paranasal sinuses are well  aerated. The mastoid air cells are clear. The orbits are grossly  normal.      MRA of the brain:  Anterior circulation: The intracranial portions of the internal  carotid, middle cerebral, and anterior cerebral arteries are patent,  without significant focal stenosis. There is a normal anterior  communicating artery.      Posterior circulation: The intracranial portions of the vertebral  arteries are patent. The basilar artery is patent without focal  stenosis. The visualized portions of proximal posterior cerebral  arteries are patent without focal stenosis.          MRA of the neck:  Common carotid arteries: Patent.  Left internal carotid: No significant stenosis.  Right internal carotid: Motion degraded study with suggestion of mild  atherosclerotic plaque at the right carotid bifurcation not well  evaluated. Cervical vertebral arteries: Patent cervical vertebral  arteries without focal stenosis.      Impression: No evidence of acute infarct, intracranial mass effect or midline  shift.  Generalized cerebral volume loss and associated ventricular  and sulcal enlargement. No evidence of large vessel occlusion or  critical stenosis. Motion degraded study with questionable  atherosclerotic plaque at the right internal carotid artery origin.  Follow-up nonemergent CT angiogram may be obtained as clinically  indicated.      Signed by: Uzair Sheriff 10/24/2024 1:06 AM  Dictation workstation:   [id number]    Physical Exam  Constitutional:       General: He is not in acute distress.     Appearance: Normal appearance.   HENT:      Head: Normocephalic and atraumatic.      Mouth/Throat:      Mouth: Mucous membranes are moist.      Pharynx: No posterior oropharyngeal erythema.   Eyes:      Extraocular Movements: Extraocular movements intact.   Cardiovascular:      Rate and Rhythm: Normal rate and regular rhythm.      Heart sounds: Murmur (systolic) heard.   Pulmonary:      Effort: Pulmonary effort is normal. No respiratory distress.      Breath sounds: No wheezing or rales.   Abdominal:      General: Abdomen is flat. There is no distension.      Palpations: Abdomen is soft.      Tenderness: There is no abdominal tenderness. There is no guarding.   Musculoskeletal:         General: No swelling or tenderness.      Cervical back: Neck supple. No tenderness.   Skin:     General: Skin is warm and dry.   Neurological:      General: No focal deficit present.      Mental Status: He is alert and oriented to person, place, and time.      Sensory: No sensory deficit.      Motor: No weakness.      Comments: CNII- XII intact to testing  No dysdiadochokinesia  Finger to nose and shin to shin intact  No dysarthria or aphasia  NIHSS 0   Psychiatric:         Mood and Affect: Mood normal.     Relevant Results  Scheduled medications  aspirin, 81 mg, oral, Daily  atorvastatin, 80 mg, oral, Nightly  enoxaparin, 40 mg, subcutaneous, q24h  famotidine, 40 mg, oral, Daily  [Held by provider] gabapentin, 1,200 mg, oral, BID  insulin  lispro, 0-10 Units, subcutaneous, TID  [Held by provider] losartan, 50 mg, oral, Daily  magnesium oxide, 400 mg, oral, Daily  [Held by provider] metoprolol tartrate, 25 mg, oral, BID  [Held by provider] mirtazapine, 30 mg, oral, Nightly  perflutren lipid microspheres, 0.5-10 mL of dilution, intravenous, Once in imaging  perflutren protein A microsphere, 0.5 mL, intravenous, Once in imaging  polyethylene glycol, 17 g, oral, Daily  sulfamethoxazole-trimethoprim, 1 tablet, oral, q12h GARRETT  sulfur hexafluoride microsphr, 2 mL, intravenous, Once in imaging  tamsulosin, 0.4 mg, oral, Daily      Continuous medications     PRN medications  PRN medications: dextrose, dextrose, glucagon, glucagon, hydrALAZINE **FOLLOWED BY** [START ON 10/25/2024] hydrALAZINE, labetaloL, oxygen  Results for orders placed or performed during the hospital encounter of 10/23/24 (from the past 24 hours)   POCT GLUCOSE   Result Value Ref Range    POCT Glucose 167 (H) 74 - 99 mg/dL   POCT GLUCOSE   Result Value Ref Range    POCT Glucose 124 (H) 74 - 99 mg/dL   CBC   Result Value Ref Range    WBC 5.2 4.4 - 11.3 x10*3/uL    nRBC 0.0 0.0 - 0.0 /100 WBCs    RBC 3.77 (L) 4.50 - 5.90 x10*6/uL    Hemoglobin 11.4 (L) 13.5 - 17.5 g/dL    Hematocrit 34.2 (L) 41.0 - 52.0 %    MCV 91 80 - 100 fL    MCH 30.2 26.0 - 34.0 pg    MCHC 33.3 32.0 - 36.0 g/dL    RDW 12.4 11.5 - 14.5 %    Platelets 167 150 - 450 x10*3/uL   Renal Function Panel   Result Value Ref Range    Glucose 123 (H) 74 - 99 mg/dL    Sodium 139 136 - 145 mmol/L    Potassium 3.8 3.5 - 5.3 mmol/L    Chloride 105 98 - 107 mmol/L    Bicarbonate 25 21 - 32 mmol/L    Anion Gap 13 10 - 20 mmol/L    Urea Nitrogen 13 6 - 23 mg/dL    Creatinine 1.32 (H) 0.50 - 1.30 mg/dL    eGFR 56 (L) >60 mL/min/1.73m*2    Calcium 8.9 8.6 - 10.3 mg/dL    Phosphorus 3.5 2.5 - 4.9 mg/dL    Albumin 3.4 3.4 - 5.0 g/dL   Magnesium   Result Value Ref Range    Magnesium 1.43 (L) 1.60 - 2.40 mg/dL   POCT GLUCOSE   Result Value Ref  Range    POCT Glucose 136 (H) 74 - 99 mg/dL   Transthoracic Echo (TTE) Complete   Result Value Ref Range    AV pk albert 1.69 m/s    LV Biplane EF 64 %    LVOT diam 1.90 cm    MV E/A ratio 0.84     MV avg E/e' ratio 16.30     Tricuspid annular plane systolic excursion 1.6 cm    AV mn grad 6.0 mmHg    LV EF 63 %    RV free wall pk S' 10.00 cm/s    RVSP 20.6 mmHg    LVIDd 3.50 cm    Aortic Valve Area by Continuity of Peak Velocity 1.69 cm2    AV pk grad 11.4 mmHg    Aortic Valve Area by Continuity of VTI 1.41 cm2    LV A4C EF 60.9      Transthoracic Echo (TTE) Complete    Result Date: 10/24/2024            [facility] [place] [address] [address] [address]    Tel [phone] Fax [phone] TRANSTHORACIC ECHOCARDIOGRAM REPORT Patient Name:      CAITLIN LUNA      Reading Physician:    [id number] Olegario Howard MD Study Date:        10/24/2024          Ordering Provider:    [id number] SUSANA SHEPHERD MRN/PID:           [id number]            Fellow: Accession#:        [id number]        Nurse:                Sonia Asher Date of Birth/Age: 1945 / 79 years Sonographer:          Hayley Giron RD Gender:            M                   Additional Staff: Height:            175.26 cm           Admit Date: Weight:            74.84 kg            Admission Status:     Observation -                                                              Priority discharge BSA / BMI:         1.90 m2 / 24.37     Department Location:  [facility] Echo Lab                    kg/m2 Blood Pressure: 163 /77 mmHg Study Type:    TRANSTHORACIC ECHO (TTE) COMPLETE Diagnosis/ICD: Transient alteration of awareness-R40.4 Indication:    stroke rule out CPT Codes:     Echo Complete w Full Doppler-24724 Patient History: CABG:              CABG x 3. Diabetes:          Yes Pertinent History: HTN. Study Detail: The following Echo studies were  performed: 2D, M-Mode, Doppler and               color flow. Image quality for this study is adequate. Agitated               saline used as a contrast agent for intraseptal flow evaluation.  PHYSICIAN INTERPRETATION: Left Ventricle: Left ventricular ejection fraction is normal, by visual estimate at 60-65%. There are no regional wall motion abnormalities. The left ventricular cavity size is normal. Spectral Doppler shows an impaired relaxation pattern of left ventricular diastolic filling. Left Atrium: The left atrium is normal in size. Right Ventricle: The right ventricle is normal in size. There is normal right ventricular global systolic function. Right Atrium: The right atrium is normal in size. Aortic Valve: The aortic valve is trileaflet. There is mild to moderate aortic valve cusp calcification. There is evidence of mildly elevated transaortic gradients consistent with sclerosis of the aortic valve Patients with severe VHD should be evaluated by a multidisciplinary Heart Valve Team when intervention is considered. (Class I, Level of Evidence: C) Consultation with or referral to a Heart Valve Center of Excellence is reasonable when discussing treatment options for 1) asymptomatic patients with severe VHD, 2) patients who may benefit from valve repair versus valve replacement, or 3) patients with multiple comorbidities for whom valve intervention is considered. (Class IIb, Level of Evidence: C) Circulation. 2014 Enmanuel 10;129(23):4902-83. The aortic valve dimensionless index is 0.52. There is no evidence of aortic valve regurgitation. The peak instantaneous gradient of the aortic valve is 11.4 mmHg. The mean gradient of the aortic valve is 6.0 mmHg. Mitral Valve: The mitral valve is normal in structure. There is no evidence of mitral valve regurgitation. Tricuspid Valve: The tricuspid valve is structurally normal. There is trace tricuspid regurgitation. The Doppler estimated RVSP is within normal limits at 20.6  mmHg. Pulmonic Valve: The pulmonic valve is structurally normal. There is mild pulmonic valve regurgitation. Pericardium: No pericardial effusion noted. Aorta: The aortic root is normal. There is no dilatation of the ascending aorta. There is no dilatation of the aortic root. Pulmonary Artery: The Doppler estimated pulmonary artery diastolic pressure is 8.7 mmHg. Systemic Veins: The inferior vena cava appears normal in size, with IVC inspiratory collapse less than 50%.  CONCLUSIONS:  1. Left ventricular ejection fraction is normal, by visual estimate at 60-65%.  2. Spectral Doppler shows an impaired relaxation pattern of left ventricular diastolic filling.  3. There is normal right ventricular global systolic function.  4. Right ventricular within normal limits.  5. Aortic valve sclerosis. QUANTITATIVE DATA SUMMARY:  2D MEASUREMENTS:           Normal Ranges: LAs:             3.60 cm   (2.7-4.0cm) IVSd:            1.17 cm   (0.6-1.1cm) LVPWd:           1.00 cm   (0.6-1.1cm) LVIDd:           3.50 cm   (3.9-5.9cm) LVIDs:           2.27 cm LV Mass Index:   61.2 g/m2 LV % FS          35.1 %  LA VOLUME:                  Normal Ranges: LA Area A4C:      9.5 cm2 LA Area A2C:      6.7 cm2 LA Volume Index:  8.4 ml/m2 LA Vol A4C:       18.0 ml LA Vol A2C:       11.0 ml LA Vol Index BSA: 7.6 ml/m2 LA Vol BP:        16.0 ml  M-MODE MEASUREMENTS:         Normal Ranges: Ao Root:             3.00 cm (2.0-3.7cm) AoV Exc:             1.40 cm (1.5-2.5cm)  AORTA MEASUREMENTS:         Normal Ranges: AoV Exc:            1.40 cm (1.5-2.5cm) Ao Sinus, d:        3.40 cm (2.1-3.5cm) Ao STJ, d:          2.70 cm (1.7-3.4cm) Asc Ao, d:          3.00 cm (2.1-3.4cm)  LV SYSTOLIC FUNCTION BY 2D PLANIMETRY (MOD):                      Normal Ranges: EF-A4C View:    61 % (>=55%) EF-A2C View:    67 % EF-Biplane:     64 % EF-Visual:      63 % LV EF Reported: 63 %  LV DIASTOLIC FUNCTION:           Normal Ranges: MV Peak E:             0.81 m/s  (0.7-1.2  m/s) MV Peak A:             0.96 m/s  (0.42-0.7 m/s) E/A Ratio:             0.84      (1.0-2.2) MV e'                  0.046 m/s (>8.0) MV lateral e'          0.05 m/s MV medial e'           0.04 m/s E/e' Ratio:            17.66     (<8.0)  MITRAL VALVE:          Normal Ranges: MV DT:        306 msec (150-240msec)  AORTIC VALVE:                      Normal Ranges: AoV Vmax:                1.69 m/s  (<=1.7m/s) AoV Peak P.4 mmHg (<20mmHg) AoV Mean P.0 mmHg  (1.7-11.5mmHg) LVOT Max Chadwick:            1.06 m/s  (<=1.1m/s) AoV VTI:                 34.50 cm  (18-25cm) LVOT VTI:                18.10 cm LVOT Diameter:           1.90 cm   (1.8-2.4cm) AoV Area, VTI:           1.41 cm2  (2.5-5.5cm2) AoV Area,Vmax:           1.69 cm2  (2.5-4.5cm2) AoV Dimensionless Index: 0.52  RIGHT VENTRICLE: RV Basal 3.50 cm RV Mid   2.80 cm RV Major 6.5 cm TAPSE:   16.0 mm RV s'    0.10 m/s  TRICUSPID VALVE/RVSP:          Normal Ranges: Peak TR Velocity:     2.10 m/s RV Syst Pressure:     21 mmHg  (< 30mmHg)  PULMONIC VALVE:          Normal Ranges: PV Accel Time:  155 msec (>120ms) PIEDV:          1.19 m/s PADP:           8.7 mmHg  [id number] Olegario Howard MD Electronically signed on 10/24/2024 at 1:06:12 PM  ** Final **     MR brain wo IV contrast    Result Date: 10/24/2024  Interpreted By:  Uzair Sheriff, STUDY: MR BRAIN WO IV CONTRAST; MR ANGIO NECK WO IV CONTRAST; MR ANGIO HEAD WO IV CONTRAST;  10/23/2024 9:18 pm   INDICATION: Signs/Symptoms:stroke r/o.   COMPARISON: Head CT 10/23/2024.   ACCESSION NUMBER(S): [id number]; [id number]; [id number]   ORDERING CLINICIAN: SUSANA SHEPHERD   TECHNIQUE: Axial T2, FLAIR, DWI, gradient echo T2 and sagittal and coronal T1 weighted images of brain were acquired. Noncontrast time-of-flight MR angiogram of the owcavp-py-Jvwges vessels was obtained with MIP reformats. Noncontrast time of flight MR angiogram of the neck vessels was obtained with MIP reformats.   FINDINGS: Brain  MRI: There is no evidence of acute infarction. There are no extra-axial collections. There is no mass lesion and no midline shift. There is no hydrocephalus. Motion degraded study with mild generalized cerebral volume loss and associated ventricular and sulcal enlargement. No significant focal parenchymal abnormality.   Paranasal Sinuses and Mastoids: Visualized paranasal sinuses are well aerated. The mastoid air cells are clear. The orbits are grossly normal.   MRA of the brain: Anterior circulation: The intracranial portions of the internal carotid, middle cerebral, and anterior cerebral arteries are patent, without significant focal stenosis. There is a normal anterior communicating artery.   Posterior circulation: The intracranial portions of the vertebral arteries are patent. The basilar artery is patent without focal stenosis. The visualized portions of proximal posterior cerebral arteries are patent without focal stenosis.     MRA of the neck: Common carotid arteries: Patent. Left internal carotid: No significant stenosis. Right internal carotid: Motion degraded study with suggestion of mild atherosclerotic plaque at the right carotid bifurcation not well evaluated. Cervical vertebral arteries: Patent cervical vertebral arteries without focal stenosis.       No evidence of acute infarct, intracranial mass effect or midline shift. Generalized cerebral volume loss and associated ventricular and sulcal enlargement. No evidence of large vessel occlusion or critical stenosis. Motion degraded study with questionable atherosclerotic plaque at the right internal carotid artery origin. Follow-up nonemergent CT angiogram may be obtained as clinically indicated.   Signed by: Uzair Sheriff 10/24/2024 1:06 AM Dictation workstation:   [id number]    MR angio head wo IV contrast    Result Date: 10/24/2024  Interpreted By:  Uzair Sheriff, STUDY: MR BRAIN WO IV CONTRAST; MR ANGIO NECK WO IV CONTRAST; MR ANGIO HEAD WO IV  CONTRAST;  10/23/2024 9:18 pm   INDICATION: Signs/Symptoms:stroke r/o.   COMPARISON: Head CT 10/23/2024.   ACCESSION NUMBER(S): [id number]; [id number]; [id number]   ORDERING CLINICIAN: SUSANA SHEPHERD   TECHNIQUE: Axial T2, FLAIR, DWI, gradient echo T2 and sagittal and coronal T1 weighted images of brain were acquired. Noncontrast time-of-flight MR angiogram of the bbdqih-qs-Xwfycc vessels was obtained with MIP reformats. Noncontrast time of flight MR angiogram of the neck vessels was obtained with MIP reformats.   FINDINGS: Brain MRI: There is no evidence of acute infarction. There are no extra-axial collections. There is no mass lesion and no midline shift. There is no hydrocephalus. Motion degraded study with mild generalized cerebral volume loss and associated ventricular and sulcal enlargement. No significant focal parenchymal abnormality.   Paranasal Sinuses and Mastoids: Visualized paranasal sinuses are well aerated. The mastoid air cells are clear. The orbits are grossly normal.   MRA of the brain: Anterior circulation: The intracranial portions of the internal carotid, middle cerebral, and anterior cerebral arteries are patent, without significant focal stenosis. There is a normal anterior communicating artery.   Posterior circulation: The intracranial portions of the vertebral arteries are patent. The basilar artery is patent without focal stenosis. The visualized portions of proximal posterior cerebral arteries are patent without focal stenosis.     MRA of the neck: Common carotid arteries: Patent. Left internal carotid: No significant stenosis. Right internal carotid: Motion degraded study with suggestion of mild atherosclerotic plaque at the right carotid bifurcation not well evaluated. Cervical vertebral arteries: Patent cervical vertebral arteries without focal stenosis.       No evidence of acute infarct, intracranial mass effect or midline shift. Generalized cerebral volume loss and associated  ventricular and sulcal enlargement. No evidence of large vessel occlusion or critical stenosis. Motion degraded study with questionable atherosclerotic plaque at the right internal carotid artery origin. Follow-up nonemergent CT angiogram may be obtained as clinically indicated.   Signed by: Uzair Sheriff 10/24/2024 1:06 AM Dictation workstation:   [id number]    MR angio neck wo IV contrast    Result Date: 10/24/2024  Interpreted By:  Uzair Sheriff, STUDY: MR BRAIN WO IV CONTRAST; MR ANGIO NECK WO IV CONTRAST; MR ANGIO HEAD WO IV CONTRAST;  10/23/2024 9:18 pm   INDICATION: Signs/Symptoms:stroke r/o.   COMPARISON: Head CT 10/23/2024.   ACCESSION NUMBER(S): [id number]; [id number]; [id number]   ORDERING CLINICIAN: SUSANA SHEPHERD   TECHNIQUE: Axial T2, FLAIR, DWI, gradient echo T2 and sagittal and coronal T1 weighted images of brain were acquired. Noncontrast time-of-flight MR angiogram of the bvzlvw-[id number]-Vjicxr vessels was obtained with MIP reformats. Noncontrast time of flight MR angiogram of the neck vessels was obtained with MIP reformats.   FINDINGS: Brain MRI: There is no evidence of acute infarction. There are no extra-axial collections. There is no mass lesion and no midline shift. There is no hydrocephalus. Motion degraded study with mild generalized cerebral volume loss and associated ventricular and sulcal enlargement. No significant focal parenchymal abnormality.   Paranasal Sinuses and Mastoids: Visualized paranasal sinuses are well aerated. The mastoid air cells are clear. The orbits are grossly normal.   MRA of the brain: Anterior circulation: The intracranial portions of the internal carotid, middle cerebral, and anterior cerebral arteries are patent, without significant focal stenosis. There is a normal anterior communicating artery.   Posterior circulation: The intracranial portions of the vertebral arteries are patent. The basilar artery is patent without focal stenosis. The visualized portions  of proximal posterior cerebral arteries are patent without focal stenosis.     MRA of the neck: Common carotid arteries: Patent. Left internal carotid: No significant stenosis. Right internal carotid: Motion degraded study with suggestion of mild atherosclerotic plaque at the right carotid bifurcation not well evaluated. Cervical vertebral arteries: Patent cervical vertebral arteries without focal stenosis.       No evidence of acute infarct, intracranial mass effect or midline shift. Generalized cerebral volume loss and associated ventricular and sulcal enlargement. No evidence of large vessel occlusion or critical stenosis. Motion degraded study with questionable atherosclerotic plaque at the right internal carotid artery origin. Follow-up nonemergent CT angiogram may be obtained as clinically indicated.   Signed by: Uzair Sheriff 10/24/2024 1:06 AM Dictation workstation:   [id number]    XR chest 1 view    Result Date: 10/23/2024  Interpreted By:  Jodi Healy, STUDY: XR CHEST 1 VIEW;  10/23/2024 1:24 pm   INDICATION: Signs/Symptoms:altered mentation.     COMPARISON: 06/07/2022   ACCESSION NUMBER(S): [id number]   ORDERING CLINICIAN: SUSANA SHEPHERD   FINDINGS: Two views were obtained. No focal infiltrate, pleural effusion or pneumothorax identified. The cardiac silhouette is within normal limits for size with overlying sternotomy wires and surgical clips again seen.       No acute cardiopulmonary process radiographically.   MACRO: None.   Signed by: Jodi Healy 10/23/2024 1:35 PM Dictation workstation:   [id number]    CT head wo IV contrast    Result Date: 10/23/2024  Interpreted By:  Jodi Healy, STUDY: CT HEAD WO IV CONTRAST;  10/23/2024 1:07 pm   INDICATION: Signs/Symptoms:Confusion x 36 hours.     COMPARISON: 06/07/2022   ACCESSION NUMBER(S): [id number]   ORDERING CLINICIAN: EMIGDIO PARISI   TECHNIQUE: Unenhanced CT images of the head were obtained.   FINDINGS: The ventricles, cisterns and sulci are  enlarged, consistent with diffuse volume loss. There are areas of nonspecific white matter hypodensity, which are probably age related or microvascular in nature. These findings are similar to the previous exam. There is no acute intracranial hemorrhage, mass effect or midline shift. No extraaxial fluid collection.   No focal calvarial lesion.   Visualized paranasal sinuses are clear.           No acute intracranial hemorrhage or mass-effect.   MACRO: None.   Signed by: Jodi Healy 10/23/2024 1:34 PM Dictation workstation:   [id number]       Assessment/Plan   Assessment & Plan  Altered mental status    Patient is a 78 y/o M with PMHx significant for CAD s/p CABG, HTN, HLD, CKD3a, spinal stenosis, T2DM, depression, essential tremor, GERD, QI, who is presenting to [facility] due to altered mental status. On initial evaluation, the patient was HDS on RA. Labs were unremarkable including UA. CXR and CT head negative. ED contacted Neurology who recommended admission for stroke rule out.     # Altered mental status  # ? TIA  Per daughter, the patient is still not at his baseline. Exam and work to date benign, low suspicion for metabolic or toxic encephalopathy.  -UDS, UA, TSH, Alcohol level, B12, folate, and CXR unremarkable  -Lipid panel WNL with LDL <70, A1c 6.9%  -CT head negative for acute intracranial hemorrhage or mass-effect  -MRI head and MRA head/neck both negative for acute infarct but did not a possible atherosclerotic plaque at the right ICA, will need nonemergent CT angio  -ECHO with EF 60-65%  -Continue daily aspirin and high intensity statin  -Resume gabapentin and mirtazapine to assess mentation  -Permissive hypertension for 48 hours, will hold home antihypertensives and resume on discharge with prn labetalol and hydralazine for SBP >220  -q4 hour neurochecks  -Neurology consulted, appreciate recs  -PT/OT - no needs  -Given urinary symptoms, reasonable to give 10 days of Bactrim, check PSA, and have patient  follow up with Urology on discharge     # CKD3a  -Creatinine on admission 1.26, baseline ~ 1.3 - 1.6  -Avoid nephrotoxic agents as permissible  -Renally dose medications as appropriate  -Monitor kidney function     # T2DM  -Last A1c per PCP notes 6.3  -Hold home antidiabetic medications  -SSI #2 with hypoglycemia protocol     # QI  -Hemoglobin on admission 12.9, no obvious signs of bleed  -Will monitor closely     Chronic Conditions  # CAD s/p CABG - aspirin  # HTN - holding home metoprolol tartrate, losartan  # HLD - atorvastatin  # Spinal stenosis  # Depression - mirtazapine  # Essential tremor  # GERD - famotidine  -Continue home medications and management as appropriate     IVF: None  Diet: Cardiac  ABx: Not indicated  Consults: Neurology, PT/OT  DVT: Lovenox     Dispo: Will need Neurology evaluation prior to discharge. Anticipate discharge in the next 24-hours pending Neurology evaluation.     The assessment and plan were discussed with the attending physician,  Willie Viramontes, DO  Internal Medicine PGY-2

## 2024-10-24 NOTE — PROGRESS NOTES
Physical Therapy    Physical Therapy Evaluation    Patient Name: Edis Vogt  MRN: [id number]  Today's Date: 10/24/2024   Time Calculation  Start Time: 1120  Stop Time: 1134  Time Calculation (min): 14 min  3133/3133-A    Assessment/Plan   PT Assessment  Evaluation/Treatment Tolerance: Patient tolerated treatment well  Medical Staff Made Aware: Yes  Strengths: Access to adaptive/assistive products, Capable of completing ADLs semi/independent, Support and attitude of living partners  End of Session Communication: Bedside nurse  Assessment Comment: Pt is a 80 y/o male admitted for AMS. Pt able to tolerate bed mobility, transfers, gait and stair training this date. He is functioning at his baseline level of function and does not require skilled PT during stay in this facility. No acute PT needs upon discharge.     End of Session Patient Position: Up in chair, Alarm on (call light within reach)  IP OR SWING BED PT PLAN  Inpatient or Swing Bed: Inpatient  PT Plan  PT Plan: PT Eval only  PT Eval Only Reason: No acute PT needs identified  PT Frequency: PT eval only  PT Discharge Recommendations: No further acute PT  Equipment Recommended upon Discharge: Wheeled walker  PT Recommended Transfer Status: Assist x1, Contact guard  PT - OK to Discharge: Yes (Once medically cleared)    Subjective     Current Problem:  1. Transient alteration of awareness  Transthoracic Echo (TTE) Complete    Transthoracic Echo (TTE) Complete        Patient Active Problem List   Diagnosis    Essential hypertension    Atherosclerosis of coronary artery of native heart without angina pectoris    Chronic back pain    Diabetic peripheral neuropathy associated with type 2 diabetes mellitus (Multi)    Essential tremor    GERD without esophagitis    Hyperplasia of prostate without lower urinary tract symptoms (LUTS)    Lumbosacral spondylosis without myelopathy    Major depressive disorder, single episode, moderate (Multi)    Mixed hyperlipidemia    S/P  CABG x 3    Spinal stenosis, lumbar region, with neurogenic claudication    Stage 3a chronic kidney disease (Multi)    Altered mental status     General Visit Information:  General  Reason for Referral: P/w AMS.  Referred By: Dr. Potts  Family/Caregiver Present: Yes  Caregiver Feedback: Dtr present who was helpful and supportive.  Co-Treatment: OT  Co-Treatment Reason: To maximize pt safety with functional mobility and discharge planning  Prior to Session Communication: Bedside nurse  Patient Position Received: Bed, 3 rail up, Alarm on  Preferred Learning Style: verbal  General Comment: Pt pleasant and agreeable to work with therapy.    Home Living:  Home Living  Type of Home: House  Lives With: Adult children (Dtr)  Home Adaptive Equipment: Walker rolling or standard, Cane  Home Layout: Multi-level, Bed/bath upstairs, Stairs to alternate level with rails (Bed/bath on third level)  Alternate Level Stairs-Number of Steps: ~6-9 CHRISTIANO to second and third floor with HR  Home Access: Stairs to enter with rails  Entrance Stairs-Number of Steps: 3  Bathroom Shower/Tub: Walk-in shower  Bathroom Equipment: Grab bars in shower, Shower chair with back  Home Living Comments: Pt's dtr works third shift and is home during the day    Prior Level of Function:  Prior Function Per Pt/Caregiver Report  Level of [place]: Independent with ADLs and functional transfers, Independent with homemaking with ambulation  Receives Help From: Family  ADL Assistance: Independent  Homemaking Assistance: Independent  Ambulatory Assistance: Independent  Prior Function Comments: (-) falls    Precautions:  Precautions  Hearing/Visual Limitations: Wears glasses  Medical Precautions: Fall precautions (Aspiration precautions)    Objective     Pain:  Pain Assessment  Pain Assessment: 0-10  0-10 (Numeric) Pain Score: 0 - No pain    Cognition:  Cognition  Overall Cognitive Status: Within Functional Limits  Orientation Level: Oriented X4    General  Assessments:      Activity Tolerance  Endurance: Tolerates 10 - 20 min exercise with multiple rests    Sensation  Light Touch: No apparent deficits  Sensation Comment: Pt denies any n/t.     Static Sitting Balance  Static Sitting-Balance Support: Bilateral upper extremity supported, Feet supported  Static Sitting-Level of Assistance: Close supervision  Static Standing Balance  Static Standing-Balance Support: Bilateral upper extremity supported  Static Standing-Level of Assistance: Contact guard  Dynamic Standing Balance  Dynamic Standing-Balance Support: Bilateral upper extremity supported  Dynamic Standing-Level of Assistance: Contact guard    Functional Assessments:     Bed Mobility  Bed Mobility: Yes  Bed Mobility 1  Bed Mobility 1: Supine to sitting, Scooting  Level of Assistance 1: Modified independent    Transfers  Transfer: Yes  Transfer 1  Transfer From 1: Bed to, Stand to  Transfer to 1: Stand, Bed  Technique 1: Sit to stand, Stand to sit  Transfer Level of Assistance 1: Contact guard  Trials/Comments 1: x2 trials  Transfers 2  Transfer From 2: Stand to  Transfer to 2: Chair with arms  Technique 2: Stand to sit  Transfer Level of Assistance 2: Contact guard    Ambulation/Gait Training  Ambulation/Gait Training Performed: Yes  Ambulation/Gait Training 1  Surface 1: Level tile  Device 1: No device  Assistance 1: Contact guard, Close supervision  Quality of Gait 1: Forward flexed posture (decreased agus)  Comments/Distance (ft) 1: x2 trials; ~60 ft then seated rest break, ~100 ft with reciprocal gait pattern    Stairs  Stairs: Yes  Stairs  Rails 1: Right  Device 1: Railing  Assistance 1: Contact guard, Close supervision  Comment/Number of Steps 1: Pt ascended/descended 9 CHRISTIANO with reciprocal gait pattern    Extremity/Trunk Assessments:  RUE   RUE : Within Functional Limits  LUE   LUE: Within Functional Limits  RLE   RLE : Within Functional Limits  LLE   LLE : Within Functional Limits    Outcome Measures:      [facility] Basic Mobility  Turning from your back to your side while in a flat bed without using bedrails: None  Moving from lying on your back to sitting on the side of a flat bed without using bedrails: None  Moving to and from bed to chair (including a wheelchair): A little  Standing up from a chair using your arms (e.g. wheelchair or bedside chair): A little  To walk in hospital room: A little  Climbing 3-5 steps with railing: A little  Basic Mobility - Total Score: 20     Education Documentation  Precautions, taught by Radha Ghosh PT at 10/24/2024  1:23 PM.  Learner: Family, Patient  Readiness: Acceptance  Method: Explanation  Response: Verbalizes Understanding, Demonstrated Understanding, Needs Reinforcement    Body Mechanics, taught by Radha Ghosh PT at 10/24/2024  1:23 PM.  Learner: Family, Patient  Readiness: Acceptance  Method: Explanation  Response: Verbalizes Understanding, Demonstrated Understanding, Needs Reinforcement    Home Exercise Program, taught by Radha Ghosh PT at 10/24/2024  1:23 PM.  Learner: Family, Patient  Readiness: Acceptance  Method: Explanation  Response: Verbalizes Understanding, Demonstrated Understanding, Needs Reinforcement    Mobility Training, taught by Radha Ghosh PT at 10/24/2024  1:23 PM.  Learner: Family, Patient  Readiness: Acceptance  Method: Explanation  Response: Verbalizes Understanding, Demonstrated Understanding, Needs Reinforcement    Education Comments  No comments found.

## 2024-10-25 ENCOUNTER — APPOINTMENT (OUTPATIENT)
Dept: CARDIOLOGY | Facility: HOSPITAL | Age: 79
End: 2024-10-25
Payer: MEDICARE

## 2024-10-25 VITALS
SYSTOLIC BLOOD PRESSURE: 149 MMHG | WEIGHT: 165 LBS | OXYGEN SATURATION: 100 % | RESPIRATION RATE: 16 BRPM | DIASTOLIC BLOOD PRESSURE: 62 MMHG | BODY MASS INDEX: 24.44 KG/M2 | HEIGHT: 69 IN | HEART RATE: 86 BPM | TEMPERATURE: 96.4 F

## 2024-10-25 LAB
BODY SURFACE AREA: 1.91 M2
GLUCOSE BLD MANUAL STRIP-MCNC: 135 MG/DL (ref 74–99)
GLUCOSE BLD MANUAL STRIP-MCNC: 230 MG/DL (ref 74–99)
GLUCOSE BLD MANUAL STRIP-MCNC: 260 MG/DL (ref 74–99)

## 2024-10-25 PROCEDURE — 2500000004 HC RX 250 GENERAL PHARMACY W/ HCPCS (ALT 636 FOR OP/ED)

## 2024-10-25 PROCEDURE — G0378 HOSPITAL OBSERVATION PER HR: HCPCS

## 2024-10-25 PROCEDURE — 93246 EXT ECG>7D<15D RECORDING: CPT

## 2024-10-25 PROCEDURE — 2500000002 HC RX 250 W HCPCS SELF ADMINISTERED DRUGS (ALT 637 FOR MEDICARE OP, ALT 636 FOR OP/ED)

## 2024-10-25 PROCEDURE — 93248 EXT ECG>7D<15D REV&INTERPJ: CPT | Performed by: SPECIALIST

## 2024-10-25 PROCEDURE — 99232 SBSQ HOSP IP/OBS MODERATE 35: CPT | Performed by: SPECIALIST

## 2024-10-25 PROCEDURE — 99239 HOSP IP/OBS DSCHRG MGMT >30: CPT

## 2024-10-25 PROCEDURE — 82947 ASSAY GLUCOSE BLOOD QUANT: CPT | Mod: 59

## 2024-10-25 PROCEDURE — 2500000002 HC RX 250 W HCPCS SELF ADMINISTERED DRUGS (ALT 637 FOR MEDICARE OP, ALT 636 FOR OP/ED): Performed by: STUDENT IN AN ORGANIZED HEALTH CARE EDUCATION/TRAINING PROGRAM

## 2024-10-25 PROCEDURE — 2500000001 HC RX 250 WO HCPCS SELF ADMINISTERED DRUGS (ALT 637 FOR MEDICARE OP)

## 2024-10-25 ASSESSMENT — COGNITIVE AND FUNCTIONAL STATUS - GENERAL
STANDING UP FROM CHAIR USING ARMS: A LITTLE
WALKING IN HOSPITAL ROOM: A LITTLE
MOBILITY SCORE: 21
CLIMB 3 TO 5 STEPS WITH RAILING: A LITTLE

## 2024-10-25 ASSESSMENT — PAIN - FUNCTIONAL ASSESSMENT
PAIN_FUNCTIONAL_ASSESSMENT: 0-10
PAIN_FUNCTIONAL_ASSESSMENT: 0-10

## 2024-10-25 ASSESSMENT — PAIN SCALES - GENERAL
PAINLEVEL_OUTOF10: 0 - NO PAIN
PAINLEVEL_OUTOF10: 0 - NO PAIN

## 2024-10-25 NOTE — PROGRESS NOTES
"Edis Vogt is a 79 y.o. male on day 0 of admission presenting with Altered mental status.      Subjective   The patient is mostly back to baseline.    EEG evaluation is consistent with diffuse slowing, with no seizure activity noticed.         Objective     Last Recorded Vitals  Blood pressure 149/62, pulse 86, temperature 35.8 °C (96.4 °F), temperature source Temporal, resp. rate 16, height 1.753 m (5' 9\"), weight 74.8 kg (165 lb), SpO2 100%.    Physical Exam  Neurological Exam    The patient is almost back to baseline , decreased concentration, otherwise, in no active distress.      Relevant Results        NIH Stroke Scale  1A. Level of Consciousness: Alert, Keenly Responsive  1B. Ask Month and Age: Both Questions Right  1C. Blink Eyes & Squeeze Hands: Performs Both Tasks  2. Best Gaze: Normal  3. Visual: No Visual Loss  4. Facial Palsy: Normal Symmetrical Movements  5A. Motor - Left Arm: No Drift  5B. Motor - Right Arm: No Drift  6A. Motor - Left Leg: No Drift  6B. Motor - Right Leg: No Drift  7. Limb Ataxia: Absent  8. Sensory Loss: Normal  9. Best Language: No Aphasia  10. Dysarthria: Normal  11. Extinction and Inattention: No Abnormality  NIH Stroke Scale: 0           [place] Coma Scale  Best Eye Response: Spontaneous  Best Verbal Response: Oriented  Best Motor Response: Follows commands  [place] Coma Scale Score: 15                Past Medical History:   Diagnosis Date    Other chest pain 02/10/2022    Chest tightness    Other conditions influencing health status 01/06/2018    History of cough    Other hypotension 02/10/2022    Hypotension due to hypovolemia    Personal history of other diseases of the circulatory system 12/30/2021    History of abnormal electrocardiography    Personal history of other diseases of the respiratory system 01/06/2018    History of influenza     Past Surgical History:   Procedure Laterality Date    OTHER SURGICAL HISTORY  01/06/2022    Colonoscopy    OTHER SURGICAL HISTORY  " 01/06/2022    Appendectomy    OTHER SURGICAL HISTORY  01/06/2022    Coronary artery bypass graft    OTHER SURGICAL HISTORY  12/30/2021    Bypass          Assessment/Plan          Unexplained transient altered mental status with non revealing work up . MRI brain and EEG evaluation revealed no evidence of an acute focal CNS pathology.      I suggest;  I explained to the patient the need to avoid driving, operating heavy machines, or being in any condition, should he  have altered mentation may put him or others in danger ( swimming, climbing stairs, behind locked doors,  etc...)  for at least 6 months of being spell free.   He expressed understanding.      Cardiac event monitoring and follow up as outpatient   To continue on ASA.         Felipe Marie MD    Time spent 30 minutes including reviewing medical records includes examining, consulting the patient and discussing with the medical staff members.

## 2024-10-25 NOTE — DISCHARGE SUMMARY
Discharge Diagnosis  Altered mental status, suspect Transient global amnesia     Issues Requiring Follow-Up  Urinary Frequency- urology FU  Neuro FU- holter monitor ordered, continue ASA, 6 week FU    Discharge Meds     Medication List      CONTINUE taking these medications     aspirin 81 mg chewable tablet   atorvastatin 80 mg tablet; Commonly known as: Lipitor   famotidine 40 mg tablet; Commonly known as: Pepcid   gabapentin 600 mg tablet; Commonly known as: Neurontin   losartan 50 mg tablet; Commonly known as: Cozaar; Take 1 tablet (50 mg)   by mouth once daily.   magnesium oxide 400 mg (241.3 mg magnesium) tablet; Commonly known as:   Mag-Ox   metFORMIN 1,000 mg tablet; Commonly known as: Glucophage   metoprolol tartrate 25 mg tablet; Commonly known as: Lopressor; Take 1   tablet (25 mg) by mouth 2 times a day.   mirtazapine 30 mg tablet; Commonly known as: Remeron   multivitamin with minerals tablet   nitroglycerin 0.4 mg SL tablet; Commonly known as: Nitrostat   psyllium 3.4 gram packet; Commonly known as: Metamucil   tamsulosin 0.4 mg 24 hr capsule; Commonly known as: Flomax       Test Results Pending At Discharge  Pending Labs       No current pending labs.            Hospital Course  Patient is a 79 y.o. male with PMHx significant for CAD s/p CABG, HTN, HLD, CKD3a, spinal stenosis, NIDDM2, depression, essential tremor, GERD, QI, who is presenting to [facility] due to altered mental status. On initial evaluation, the patient was HDS on RA. Labs unremarkable. CT head negative. Neurology was consulted in the ED who recommended admission for stroke evaluation, and the patient was admitted to general medicine.     MRI brain was negative, MRA head and neck were negative for acute findings but did not a possible atherosclerotic plaque which would require non-emergent CT angiogram. ECHO was performed showing EF 60-65%. The patient did not demonstrate worsening of mentation but did note urinary frequency and pain with  defecation.  Bladder scan done at bedside showing PVR of 200 cc low suspicion for stroke, but given symptoms there is possible concern for prostatitis causing altered mental status. PT/OT did not recommend and further PT/OT needs. Neuro recommending spot EEG, continue aspirin, cardiac monitoring, no driving follow-up in 6 weeks.  EEG showing mild diffuse encephalopathy, no epileptiform discharges or lateral slowing seen.  The patient was then deemed stable for discharge from [facility] back home. Instructions to follow up with primary care and to get a nonemergent CT angio which has been ordered, and with Urology to evaluate for urinary hesitancy/frequency.  Follow-up with neurology ordered as well as holter monitor.    Pertinent Physical Exam At Time of Discharge  GENERAL: Alert and oriented x 3. No acute distress. Well-nourished.  EYES: EOMI. Anicteric.  HENT: Moist mucous membranes. No scleral icterus.   LUNGS: CTA BL. No accessory muscle use.  CV: RRR. No murmur. No JVD.  ABDOMEN: Soft, non-tender and non-distended. No palpable masses. BS+ x4  -EXTREMITIES: No edema. Non-tender.?  SKIN: No rashes or lesions. Warm.  NEUROLOGIC: no dysarthria. CN II-XI intact. Pupils equal, round, react to light. Visual fields are full. There is no facial asymmetry noted. Manual motor testing reveals good tone and bulk throughout. There is no evidence of pronator drift or decreased fine finger movements. Muscle strength is 5/5 throughout. Finger-to-nose and heel-to-shin moves are normal. Romberg sign negative.  PSYCHIATRIC: Cooperative.     Outpatient Follow-Up  Future Appointments   Date Time Provider Department Center   7/1/2025 11:30 AM Sandhya Gaytan MD [id number] Sami Ackerman DO

## 2024-10-25 NOTE — CARE PLAN
The patient's goals for the shift include sleep and comfort     The clinical goals for the shift include remain free of falls and injury      Problem: General Stroke  Goal: Establish a mutual long term goal with patient by discharge  Outcome: Progressing  Goal: Demonstrate improvement in neurological exam throughout the shift  Outcome: Progressing  Goal: Maintain BP within ordered limits throughout shift  Outcome: Progressing  Goal: Participate in treatment (ie., meds, therapy) throughout shift  Outcome: Progressing  Goal: No symptoms of aspiration throughout shift  Outcome: Progressing  Goal: No symptoms of hemorrhage throughout shift  Outcome: Progressing  Goal: Controlled blood glucose throughout shift  Outcome: Progressing  Goal: Out of bed three times today  Outcome: Progressing     Problem: Pain - Adult  Goal: Verbalizes/displays adequate comfort level or baseline comfort level  Outcome: Progressing     Problem: Safety - Adult  Goal: Free from fall injury  Outcome: Progressing     Problem: Discharge Planning  Goal: Discharge to home or other facility with appropriate resources  Outcome: Progressing     Problem: Chronic Conditions and Co-morbidities  Goal: Patient's chronic conditions and co-morbidity symptoms are monitored and maintained or improved  Outcome: Progressing     Problem: Diabetes  Goal: Achieve decreasing blood glucose levels by end of shift  Outcome: Progressing  Goal: Increase stability of blood glucose readings by end of shift  Outcome: Progressing  Goal: Maintain glucose levels >70mg/dl to <250mg/dl throughout shift  Outcome: Progressing  Goal: No changes in neurological exam by end of shift  Outcome: Progressing  Goal: Increase self care and/or family involovement by end of shift  Outcome: Progressing     Problem: Fall/Injury  Goal: Not fall by end of shift  Outcome: Progressing  Goal: Be free from injury by end of the shift  Outcome: Progressing  Goal: Verbalize understanding of personal  risk factors for fall in the hospital  Outcome: Progressing  Goal: Verbalize understanding of risk factor reduction measures to prevent injury from fall in the home  Outcome: Progressing  Goal: Use assistive devices by end of the shift  Outcome: Progressing  Goal: Pace activities to prevent fatigue by end of the shift  Outcome: Progressing   .

## 2024-11-02 LAB
ATRIAL RATE: 70 BPM
P AXIS: 48 DEGREES
P OFFSET: 181 MS
P ONSET: 132 MS
PR INTERVAL: 162 MS
Q ONSET: 213 MS
QRS COUNT: 12 BEATS
QRS DURATION: 90 MS
QT INTERVAL: 402 MS
QTC CALCULATION(BAZETT): 434 MS
QTC FREDERICIA: 423 MS
R AXIS: -11 DEGREES
T AXIS: 36 DEGREES
T OFFSET: 414 MS
VENTRICULAR RATE: 70 BPM

## 2024-11-12 ENCOUNTER — APPOINTMENT (OUTPATIENT)
Dept: CARDIOLOGY | Facility: CLINIC | Age: 79
End: 2024-11-12
Payer: MEDICARE

## 2024-11-12 VITALS
WEIGHT: 173 LBS | HEART RATE: 84 BPM | BODY MASS INDEX: 25.62 KG/M2 | HEIGHT: 69 IN | DIASTOLIC BLOOD PRESSURE: 64 MMHG | SYSTOLIC BLOOD PRESSURE: 96 MMHG

## 2024-11-12 DIAGNOSIS — E78.2 MIXED HYPERLIPIDEMIA: ICD-10-CM

## 2024-11-12 DIAGNOSIS — R41.0 CONFUSION: ICD-10-CM

## 2024-11-12 DIAGNOSIS — I10 ESSENTIAL HYPERTENSION: ICD-10-CM

## 2024-11-12 DIAGNOSIS — I25.10 ATHEROSCLEROSIS OF NATIVE CORONARY ARTERY OF NATIVE HEART WITHOUT ANGINA PECTORIS: Primary | ICD-10-CM

## 2024-11-12 DIAGNOSIS — Z95.1 S/P CABG X 3: ICD-10-CM

## 2024-11-12 PROCEDURE — 1160F RVW MEDS BY RX/DR IN RCRD: CPT | Performed by: INTERNAL MEDICINE

## 2024-11-12 PROCEDURE — 99214 OFFICE O/P EST MOD 30 MIN: CPT | Performed by: INTERNAL MEDICINE

## 2024-11-12 PROCEDURE — 1157F ADVNC CARE PLAN IN RCRD: CPT | Performed by: INTERNAL MEDICINE

## 2024-11-12 PROCEDURE — G2211 COMPLEX E/M VISIT ADD ON: HCPCS | Performed by: INTERNAL MEDICINE

## 2024-11-12 PROCEDURE — 1159F MED LIST DOCD IN RCRD: CPT | Performed by: INTERNAL MEDICINE

## 2024-11-12 PROCEDURE — 3074F SYST BP LT 130 MM HG: CPT | Performed by: INTERNAL MEDICINE

## 2024-11-12 PROCEDURE — 4004F PT TOBACCO SCREEN RCVD TLK: CPT | Performed by: INTERNAL MEDICINE

## 2024-11-12 PROCEDURE — 3078F DIAST BP <80 MM HG: CPT | Performed by: INTERNAL MEDICINE

## 2024-11-12 RX ORDER — DONEPEZIL HYDROCHLORIDE 5 MG/1
5 TABLET, FILM COATED ORAL NIGHTLY
COMMUNITY
Start: 2024-11-06

## 2024-11-12 RX ORDER — CHOLESTYRAMINE 4 G/9G
1 POWDER, FOR SUSPENSION ORAL AS NEEDED
COMMUNITY
Start: 2024-09-16

## 2024-11-12 ASSESSMENT — ENCOUNTER SYMPTOMS
CONSTITUTIONAL NEGATIVE: 1
RESPIRATORY NEGATIVE: 1
BACK PAIN: 1
CARDIOVASCULAR NEGATIVE: 1
GASTROINTESTINAL NEGATIVE: 1
DIFFICULTY URINATING: 1
NEUROLOGICAL NEGATIVE: 1
CONFUSION: 1
DYSURIA: 0

## 2024-11-12 NOTE — PROGRESS NOTES
Patient:  Edis Vogt  YOB: 1945  MRN: [id number]       Chief Complaint/Active Symptoms:       Edis Vogt is a 79 y.o. male who returns today for cardiac follow-up. Dr. Tony Kelley    Patient hospitalized in October. Had been in bed for 3 days and when he did get up he was confused. There was no focal weakness and no speech impediment. Just confused.   .  No chest pain or discomfort. No shortness of breath, orthopnea or PND. No palpitations, dizziness or lightheadedness.     Patient was sent home on a monitor but he was unable to keep it charged so he removed it several days early and sent it back.     Review of Systems   Constitutional: Negative.    HENT:  Positive for hearing loss.    Respiratory: Negative.     Cardiovascular: Negative.    Gastrointestinal: Negative.    Genitourinary:  Positive for difficulty urinating. Negative for dysuria.   Musculoskeletal:  Positive for back pain.   Neurological: Negative.    Psychiatric/Behavioral:  Positive for confusion.    All other systems reviewed and are negative.      Objective:     Vitals:    11/12/24 1104   BP: 96/64   Pulse: 84       Vitals:    11/12/24 1104   Weight: 78.5 kg (173 lb)       Allergies:     No Known Allergies       Medications:     Current Outpatient Medications   Medication Instructions    aspirin 81 mg, Daily RT    atorvastatin (LIPITOR) 80 mg, Nightly    cholestyramine (Questran) 4 gram packet 1 packet, As needed    donepezil (ARICEPT) 5 mg, Nightly    famotidine (PEPCID) 40 mg, Daily    gabapentin (NEURONTIN) 1,200 mg, 2 times daily    losartan (COZAAR) 50 mg, oral, Daily    magnesium oxide (MAG-OX) 400 mg, Daily    metFORMIN (GLUCOPHAGE) 1,000 mg, 2 times daily (morning and late afternoon)    metoprolol tartrate (LOPRESSOR) 25 mg, oral, 2 times daily    mirtazapine (REMERON) 30 mg, Nightly    multivitamin with minerals tablet 1 tablet, Daily RT    nitroglycerin (NITROSTAT) 0.4 mg, Every 5 min PRN    psyllium (Metamucil) 3.4  gram packet 1 packet, Daily PRN    tamsulosin (FLOMAX) 0.4 mg, Daily       Physical Examination:   GENERAL:  Well developed, well nourished, in no acute distress.  HEENT: NC AT, EOMI with anicteric sclera  NECK:   no JVD, no bruit.  CHEST:  Symmetric and nontender.  LUNGS:  Clear to auscultation bilaterally, normal respiratory effort.  HEART:  PMI is nondisplaced. RRR with normal S1 and S2, no S3, no mumur or rub. No carotid or abdominal bruits  EXTREMITIES:  Warm with good color, no clubbing or cyanosis.  There is no edema noted.  PERIPHERAL VASCULAR:  Pulses present and equally palpable; 2+ throughout.  MUSCULOSKELETAL: Mild osteoarthritic changes  NEURO/PSYCH:  Alert and oriented times three with approppriate behavior and responses. Nonfocal motor examination with normal gait and ambulation  Skin: no rash or lesions on exposed skin or reported.    Lab:     CBC:   Lab Results   Component Value Date    WBC 5.2 10/24/2024    RBC 3.77 (L) 10/24/2024    HGB 11.4 (L) 10/24/2024    HCT 34.2 (L) 10/24/2024     10/24/2024        CMP:    Lab Results   Component Value Date     10/24/2024    K 3.8 10/24/2024     10/24/2024    CO2 25 10/24/2024    BUN 13 10/24/2024    CREATININE 1.32 (H) 10/24/2024    GLUCOSE 123 (H) 10/24/2024    CALCIUM 8.9 10/24/2024       Magnesium:    Lab Results   Component Value Date    MG 1.43 (L) 10/24/2024       Lipid Profile:    Lab Results   Component Value Date    TRIG 100 10/23/2024    HDL 29.4 10/23/2024    LDLCALC 60 10/23/2024       TSH:    Lab Results   Component Value Date    TSH 1.84 10/23/2024       BNP:   Lab Results   Component Value Date     (H) 10/23/2024        PT/INR:    Lab Results   Component Value Date    PROTIME 13.8 (H) 06/07/2022    INR 1.2 (H) 06/07/2022       HgBA1c:    Lab Results   Component Value Date    HGBA1C 6.9 (H) 10/23/2024       BMP:  Lab Results   Component Value Date     10/24/2024     10/23/2024     06/08/2022    NA  140 06/07/2022     01/11/2022    K 3.8 10/24/2024    K 3.9 10/23/2024    K 3.7 06/08/2022    K 4.3 06/07/2022    K 4.9 01/11/2022     10/24/2024     10/23/2024     06/08/2022     06/07/2022     01/11/2022    CO2 25 10/24/2024    CO2 23 10/23/2024    CO2 22 06/08/2022    CO2 26 06/07/2022    CO2 31 01/11/2022    BUN 13 10/24/2024    BUN 14 10/23/2024    BUN 17 06/08/2022    BUN 22 06/07/2022    BUN 21 01/11/2022    CREATININE 1.32 (H) 10/24/2024    CREATININE 1.26 10/23/2024    CREATININE 1.37 (H) 06/08/2022    CREATININE 1.63 (H) 06/07/2022    CREATININE 1.59 (H) 01/11/2022       Cardiac Enzymes:    Lab Results   Component Value Date    TROPHS 6 10/23/2024    TROPHS 5 10/23/2024    TROPHS 7 06/07/2022       Hepatic Function Panel:    Lab Results   Component Value Date    ALKPHOS 78 10/23/2024    ALT 17 10/23/2024    AST 18 10/23/2024    PROT 6.3 (L) 10/23/2024    BILITOT 0.8 10/23/2024         Diagnostic Studies:     Transthoracic Echo (TTE) Complete    Result Date: 10/24/2024            [facility] [id number] [facility] [address]    Tel [phone] Fax [phone] TRANSTHORACIC ECHOCARDIOGRAM REPORT Patient Name:      CAITLIN Addison Physician:    [id number] Olegario Howard MD Study Date:        10/24/2024          Ordering Provider:    [id number] SUSANA SHEPHERD MRN/PID:           [id number]            Fellow: Accession#:        [id number]        Nurse:                Sonia Asher Date of Birth/Age: 1945 / 79 years Sonographer:          Hayley Giron RD Gender:            M                   Additional Staff: Height:            175.26 cm           Admit Date: Weight:            74.84 kg            Admission Status:     Observation -                                                              Priority discharge BSA / BMI:         1.90  m2 / 24.37     Department Location:  [facility] Echo Lab                    kg/m2 Blood Pressure: 163 /77 mmHg Study Type:    TRANSTHORACIC ECHO (TTE) COMPLETE Diagnosis/ICD: Transient alteration of awareness-R40.4 Indication:    stroke rule out CPT Codes:     Echo Complete w Full [id number] Patient History: CABG:              CABG x 3. Diabetes:          Yes Pertinent History: HTN. Study Detail: The following Echo studies were performed: 2D, M-Mode, Doppler and               color flow. Image quality for this study is adequate. Agitated               saline used as a contrast agent for intraseptal flow evaluation.  PHYSICIAN INTERPRETATION: Left Ventricle: Left ventricular ejection fraction is normal, by visual estimate at 60-65%. There are no regional wall motion abnormalities. The left ventricular cavity size is normal. Spectral Doppler shows an impaired relaxation pattern of left ventricular diastolic filling. Left Atrium: The left atrium is normal in size. Right Ventricle: The right ventricle is normal in size. There is normal right ventricular global systolic function. Right Atrium: The right atrium is normal in size. Aortic Valve: The aortic valve is trileaflet. There is mild to moderate aortic valve cusp calcification. There is evidence of mildly elevated transaortic gradients consistent with sclerosis of the aortic valve Patients with severe VHD should be evaluated by a multidisciplinary Heart Valve Team when intervention is considered. (Class I, Level of Evidence: C) Consultation with or referral to a Heart Valve Center of Excellence is reasonable when discussing treatment options for 1) asymptomatic patients with severe VHD, 2) patients who may benefit from valve repair versus valve replacement, or 3) patients with multiple comorbidities for whom valve intervention is considered. (Class IIb, Level of Evidence: C) Circulation. 2014 Enmanuel 10;129(23):9508-36. The aortic valve dimensionless index is 0.52. There is  no evidence of aortic valve regurgitation. The peak instantaneous gradient of the aortic valve is 11.4 mmHg. The mean gradient of the aortic valve is 6.0 mmHg. Mitral Valve: The mitral valve is normal in structure. There is no evidence of mitral valve regurgitation. Tricuspid Valve: The tricuspid valve is structurally normal. There is trace tricuspid regurgitation. The Doppler estimated RVSP is within normal limits at 20.6 mmHg. Pulmonic Valve: The pulmonic valve is structurally normal. There is mild pulmonic valve regurgitation. Pericardium: No pericardial effusion noted. Aorta: The aortic root is normal. There is no dilatation of the ascending aorta. There is no dilatation of the aortic root. Pulmonary Artery: The Doppler estimated pulmonary artery diastolic pressure is 8.7 mmHg. Systemic Veins: The inferior vena cava appears normal in size, with IVC inspiratory collapse less than 50%.  CONCLUSIONS:  1. Left ventricular ejection fraction is normal, by visual estimate at 60-65%.  2. Spectral Doppler shows an impaired relaxation pattern of left ventricular diastolic filling.  3. There is normal right ventricular global systolic function.  4. Right ventricular within normal limits.  5. Aortic valve sclerosis. QUANTITATIVE DATA SUMMARY:  2D MEASUREMENTS:           Normal Ranges: LAs:             3.60 cm   (2.7-4.0cm) IVSd:            1.17 cm   (0.6-1.1cm) LVPWd:           1.00 cm   (0.6-1.1cm) LVIDd:           3.50 cm   (3.9-5.9cm) LVIDs:           2.27 cm LV Mass Index:   61.2 g/m2 LV % FS          35.1 %  LA VOLUME:                  Normal Ranges: LA Area A4C:      9.5 cm2 LA Area A2C:      6.7 cm2 LA Volume Index:  8.4 ml/m2 LA Vol A4C:       18.0 ml LA Vol A2C:       11.0 ml LA Vol Index BSA: 7.6 ml/m2 LA Vol BP:        16.0 ml  M-MODE MEASUREMENTS:         Normal Ranges: Ao Root:             3.00 cm (2.0-3.7cm) AoV Exc:             1.40 cm (1.5-2.5cm)  AORTA MEASUREMENTS:         Normal Ranges: AoV Exc:             1.40 cm (1.5-2.5cm) Ao Sinus, d:        3.40 cm (2.1-3.5cm) Ao STJ, d:          2.70 cm (1.7-3.4cm) Asc Ao, d:          3.00 cm (2.1-3.4cm)  LV SYSTOLIC FUNCTION BY 2D PLANIMETRY (MOD):                      Normal Ranges: EF-A4C View:    61 % (>=55%) EF-A2C View:    67 % EF-Biplane:     64 % EF-Visual:      63 % LV EF Reported: 63 %  LV DIASTOLIC FUNCTION:           Normal Ranges: MV Peak E:             0.81 m/s  (0.7-1.2 m/s) MV Peak A:             0.96 m/s  (0.42-0.7 m/s) E/A Ratio:             0.84      (1.0-2.2) MV e'                  0.046 m/s (>8.0) MV lateral e'          0.05 m/s MV medial e'           0.04 m/s E/e' Ratio:            17.66     (<8.0)  MITRAL VALVE:          Normal Ranges: MV DT:        306 msec (150-240msec)  AORTIC VALVE:                      Normal Ranges: AoV Vmax:                1.69 m/s  (<=1.7m/s) AoV Peak P.4 mmHg (<20mmHg) AoV Mean P.0 mmHg  (1.7-11.5mmHg) LVOT Max Chadwick:            1.06 m/s  (<=1.1m/s) AoV VTI:                 34.50 cm  (18-25cm) LVOT VTI:                18.10 cm LVOT Diameter:           1.90 cm   (1.8-2.4cm) AoV Area, VTI:           1.41 cm2  (2.5-5.5cm2) AoV Area,Vmax:           1.69 cm2  (2.5-4.5cm2) AoV Dimensionless Index: 0.52  RIGHT VENTRICLE: RV Basal 3.50 cm RV Mid   2.80 cm RV Major 6.5 cm TAPSE:   16.0 mm RV s'    0.10 m/s  TRICUSPID VALVE/RVSP:          Normal Ranges: Peak TR Velocity:     2.10 m/s RV Syst Pressure:     21 mmHg  (< 30mmHg)  PULMONIC VALVE:          Normal Ranges: PV Accel Time:  155 msec (>120ms) PIEDV:          1.19 m/s PADP:           8.7 mmHg  [id number] Olegario Howard MD Electronically signed on 10/24/2024 at 1:06:12 PM  ** Final **      No nuclear medicine results found for the past 12 months    Rhythm strips from the hospital reviewed shows sinus rhythm without ectopy  Radiology:     No orders to display     ASSESSMENT     Problem List Items Addressed This Visit       Essential hypertension    Atherosclerosis  of coronary artery of native heart without angina pectoris - Primary    Mixed hyperlipidemia    S/P CABG x 3    Confusion       PLAN   1.  Confusion.  The patient had altered mental status and talking to his daughter who lives with him now it is hard to determine what was going on.  He laid in bed without taking his medications eating or drinking for 2 days.  He improved when he was hospitalized without any apparent abnormality.  He is not felt that he had a stroke but they recommended neurological follow-up.  They also recommended a monitor even though he was not diagnosed with a stroke or TIA that monitor result is still pending.  Given his description and the daughter's description of what transpired there is no logical explanation of any cardiac diagnoses that would cause those symptoms.  I would defer any evaluation or workup of his confusion and altered mental status to his primary care physician, psychiatry or neurology.  2.  Coronary artery disease stable without angina pectoris.  He remains stable without any angina or heart failure symptoms continue with secondary prevention.  3.  Hypertension.  Blood pressures of anything are on the low side would recommend that he maintain his hydration.  Repeat blood pressure sitting is normal so I suspect there may be some orthostasis from his Flomax.  I suggest that he take that in the evening and remain hydrated.  4.  Mixed hyperlipidemia.  Continue statin therapy.    I will see the patient in follow-up in 6 months sooner if there is problems or concerns.  If he is doing well at that time we will go back to seeing him on an annual basis

## 2024-12-03 ENCOUNTER — DOCUMENTATION (OUTPATIENT)
Dept: CARDIOLOGY | Facility: HOSPITAL | Age: 79
End: 2024-12-03

## 2024-12-03 ENCOUNTER — APPOINTMENT (OUTPATIENT)
Dept: UROLOGY | Facility: CLINIC | Age: 79
End: 2024-12-03
Payer: MEDICARE

## 2024-12-03 VITALS
HEART RATE: 83 BPM | DIASTOLIC BLOOD PRESSURE: 65 MMHG | SYSTOLIC BLOOD PRESSURE: 106 MMHG | TEMPERATURE: 97.3 F | RESPIRATION RATE: 25 BRPM

## 2024-12-03 DIAGNOSIS — N32.81 OAB (OVERACTIVE BLADDER): Primary | ICD-10-CM

## 2024-12-03 DIAGNOSIS — R35.0 URINARY FREQUENCY: ICD-10-CM

## 2024-12-03 PROBLEM — I48.0 PAROXYSMAL ATRIAL FIBRILLATION (MULTI): Status: ACTIVE | Noted: 2024-12-03

## 2024-12-03 LAB
BODY SURFACE AREA: 1.91 M2
POC APPEARANCE, URINE: CLEAR
POC BILIRUBIN, URINE: NEGATIVE
POC BLOOD, URINE: NEGATIVE
POC COLOR, URINE: YELLOW
POC GLUCOSE, URINE: NEGATIVE MG/DL
POC KETONES, URINE: ABNORMAL MG/DL
POC LEUKOCYTES, URINE: NEGATIVE
POC NITRITE,URINE: NEGATIVE
POC PH, URINE: 5.5 PH
POC PROTEIN, URINE: NEGATIVE MG/DL
POC SPECIFIC GRAVITY, URINE: 1.02
POC UROBILINOGEN, URINE: 0.2 EU/DL

## 2024-12-03 PROCEDURE — 1159F MED LIST DOCD IN RCRD: CPT | Performed by: NURSE PRACTITIONER

## 2024-12-03 PROCEDURE — 3078F DIAST BP <80 MM HG: CPT | Performed by: NURSE PRACTITIONER

## 2024-12-03 PROCEDURE — 81003 URINALYSIS AUTO W/O SCOPE: CPT | Performed by: NURSE PRACTITIONER

## 2024-12-03 PROCEDURE — 99204 OFFICE O/P NEW MOD 45 MIN: CPT | Performed by: NURSE PRACTITIONER

## 2024-12-03 PROCEDURE — 1157F ADVNC CARE PLAN IN RCRD: CPT | Performed by: NURSE PRACTITIONER

## 2024-12-03 PROCEDURE — 51798 US URINE CAPACITY MEASURE: CPT | Performed by: NURSE PRACTITIONER

## 2024-12-03 PROCEDURE — G2211 COMPLEX E/M VISIT ADD ON: HCPCS | Performed by: NURSE PRACTITIONER

## 2024-12-03 PROCEDURE — 3074F SYST BP LT 130 MM HG: CPT | Performed by: NURSE PRACTITIONER

## 2024-12-03 RX ORDER — TROSPIUM CHLORIDE 20 MG/1
20 TABLET, FILM COATED ORAL NIGHTLY
Qty: 30 TABLET | Refills: 0 | Status: SHIPPED | OUTPATIENT
Start: 2024-12-03

## 2024-12-03 NOTE — PATIENT INSTRUCTIONS
Pelvic Floor PT Locations    [facility]  [address]  [place] [address]  [phone]      Kathy Romero, PT  B/B [facility]  [address]  [place] [address]  [phone]      Radha Santiago, PT    B/B  [facility] [place]  [address] [place] [address]  [phone]    Monika Worley, PT         B/B   [facility]  [address] [place] [address]  [phone]    Sammi Harvey, PT  (PRN)      B/B  Lou Staley, PT       B  Cristal Vaughn PT          B [facility]  [address] [place] [address]  [phone]      Hayley Antoine, PT      B/B  Terrie Perez, PT      B/B [facility]  [address]  [place] [address]  [phone]      Carl Rodriguez, PT      B    [facility]  [address] [place] [address]  [phone]      Fide Garza, PT (PRN)  B  Saige Hoffman, PT              B/B [facility]  [address] [place] [address]  [phone]    Lisa Galeano PT B [facility]  [address]  [place] [address]  [phone]      Joy Ridley, PT   B   [facility]  [address]  [place] [address]  [phone]      Robyn Mccord, PT     B/B  Beatriz Hyde, PT              B/B [facility]  [address] [place] [address]  [phone]    Alysha Caro, PT   (incontinence only) [facility]  [address]  [facility]. [place] [id number] [phone]    Coby Parks PT   B       [facility]    [address] [place] [address]  [phone]      Aissatou Durant, PT     B  Inga Lr PT    B [facility], [address]  [place], [place] [id number] [phone]    Inga Tavarze, PT       B/B  Monika Worley, PT         B  Daniella Avalos PT     B/B [facility]  [address]  [place], [place] [id number] [phone]      Robyn Galvin, PT         B/B  Desire George, PT   B/B     [facility]  [address]  [place] [address]  [phone]      Beatriz Hyde, PT                  B/B  Inga Zuluaga PT      B [facility], [address] [address]  [phone]    Daniella Avalos, PT     B/B   males/females Athletes only:   [facility]  [address]  [place] [address] [phone]    Raegan White, PT    B   [facility] [address]  [address] [place] [address]  [phone]    Romana Grant, PT B/B        B/B: [facility]

## 2024-12-03 NOTE — PROGRESS NOTES
UROLOGIC INITIAL EVALUATION     PROBLEM LIST:  1. OAB (overactive bladder)        2. Urinary frequency  Referral to Urology    Post-Void Residual    POCT UA Automated manually resulted           HISTORY OF PRESENT ILLNESS:   Edis Vogt is a 79 y.o. with CAD s/p CABG x 3, CKD 3a, DM2  Kindly referred by PCP for evaluation and management of urinary frequency  Seen accompanied by daughter, Zabrina  Reports urinary frequency x years  Also with significant urgency, double voiding  NTF 1-2, sometimes wakes due to incontinence  Has started to wear a diaper, sleeps on a towel  Difficulty sleeping but can't take sleep aid - will sleep through urge  No daytime incontinence  No hematuria or dysuria  Years of diarrhea as well and some bowel incontinence   Questran causes constipation, takes Metamucil as needed  Drinking 40 oz water/day with Liquid IV    Raised in [email or web address]  Retired, worked in [employer]  Father - stroke  Mother - dementia    PAST MEDICAL HISTORY:  Past Medical History:   Diagnosis Date    Other chest pain 02/10/2022    Chest tightness    Other conditions influencing health status 01/06/2018    History of cough    Other hypotension 02/10/2022    Hypotension due to hypovolemia    Personal history of other diseases of the circulatory system 12/30/2021    History of abnormal electrocardiography    Personal history of other diseases of the respiratory system 01/06/2018    History of influenza       PAST SURGICAL HISTORY:  Past Surgical History:   Procedure Laterality Date    OTHER SURGICAL HISTORY  01/06/2022    Colonoscopy    OTHER SURGICAL HISTORY  01/06/2022    Appendectomy    OTHER SURGICAL HISTORY  01/06/2022    Coronary artery bypass graft    OTHER SURGICAL HISTORY  12/30/2021    Bypass        ALLERGIES:   No Known Allergies     MEDICATIONS:   Current Outpatient Medications on File Prior to Visit   Medication Sig Dispense Refill    aspirin 81 mg chewable tablet Chew 1 tablet (81 mg)  once daily.      atorvastatin (Lipitor) 80 mg tablet Take 1 tablet (80 mg) by mouth once daily at bedtime.      cholestyramine (Questran) 4 gram packet Take 1 packet (4 g) by mouth if needed.      donepezil (Aricept) 5 mg tablet Take 1 tablet (5 mg) by mouth once daily at bedtime.      famotidine (Pepcid) 40 mg tablet Take 1 tablet (40 mg) by mouth once daily.      gabapentin (Neurontin) 600 mg tablet Take 2 tablets (1,200 mg) by mouth twice a day.      losartan (Cozaar) 50 mg tablet Take 1 tablet (50 mg) by mouth once daily. 90 tablet 3    magnesium oxide (Mag-Ox) 400 mg (241.3 mg magnesium) tablet Take 1 tablet (400 mg) by mouth once daily.      metFORMIN (Glucophage) 1,000 mg tablet Take 1 tablet (1,000 mg) by mouth 2 times daily (morning and late afternoon).      metoprolol tartrate (Lopressor) 25 mg tablet Take 1 tablet (25 mg) by mouth 2 times a day. 180 tablet 3    mirtazapine (Remeron) 30 mg tablet Take 1 tablet (30 mg) by mouth once daily at bedtime.      multivitamin with minerals tablet Take 1 tablet by mouth once daily.      psyllium (Metamucil) 3.4 gram packet Take 1 packet by mouth once daily as needed (for constipation).      tamsulosin (Flomax) 0.4 mg 24 hr capsule Take 1 capsule (0.4 mg) by mouth once daily.      nitroglycerin (Nitrostat) 0.4 mg SL tablet Place 1 tablet (0.4 mg) under the tongue every 5 minutes if needed for chest pain. (Patient not taking: Reported on 12/3/2024)       No current facility-administered medications on file prior to visit.        SOCIAL HISTORY:  Patient  reports that he has never smoked. His smokeless tobacco use includes chew. He reports that he does not currently use alcohol. He reports that he does not currently use drugs.   Social History     Socioeconomic History    Marital status:      Spouse name: Not on file    Number of children: Not on file    Years of education: Not on file    Highest education level: Not on file   Occupational History    Not on file    Tobacco Use    Smoking status: Never    Smokeless tobacco: Current     Types: Chew   Substance and Sexual Activity    Alcohol use: Not Currently    Drug use: Not Currently    Sexual activity: Defer   Other Topics Concern    Not on file   Social History Narrative    Not on file     Social Drivers of Health     Financial Resource Strain: Low Risk  (11/27/2024)    Received from [facility]    Overall Financial Resource Strain (CARDIA)     Difficulty of Paying Living Expenses: Not hard at all   Food Insecurity: No Food Insecurity (11/27/2024)    Received from [facility]    Hunger Vital Sign     Worried About Running Out of Food in the Last Year: Never true     Ran Out of Food in the Last Year: Never true   Transportation Needs: No Transportation Needs (11/27/2024)    Received from [facility]    PRAPARE - Transportation     Lack of Transportation (Medical): No     Lack of Transportation (Non-Medical): No   Physical Activity: Inactive (11/27/2024)    Received from [facility]    Exercise Vital Sign     Days of Exercise per Week: 0 days     Minutes of Exercise per Session: 0 min   Stress: No Stress Concern Present (11/27/2024)    Received from [facility]    [place] [employer] [employer] - Occupational Stress Questionnaire     Feeling of Stress : Only a little   Social Connections: Moderately Isolated (11/27/2024)    Received from [facility]    Social Connection and Isolation Panel [NHANES]     Frequency of Communication with Friends and Family: More than three times a week     Frequency of Social Gatherings with Friends and Family: Once a week     Attends Shinto Services: Never     Active Member of Clubs or Organizations: No     Attends Club or Organization Meetings: 1 to 4 times per year     Marital Status:    Intimate Partner Violence: Not At Risk (10/23/2024)    Humiliation, Afraid, Rape, and Kick questionnaire     Fear of Current or Ex-Partner: No     Emotionally  Abused: No     Physically Abused: No     Sexually Abused: No   Housing Stability: Low Risk  (11/27/2024)    Received from [facility]    Housing Stability Vital Sign     Unable to Pay for Housing in the Last Year: No     Number of Times Moved in the Last Year: 0     Homeless in the Last Year: No       FAMILY HISTORY:  No family history on file.    REVIEW OF SYSTEMS:  All systems reviewed, pertinent negatives as noted in HPI.     PHYSICAL EXAM:  Visit Vitals  /65   Pulse 83   Temp 36.3 °C (97.3 °F) (Temporal)   Resp 25     Constitutional: Well-developed and well-nourished. No distress.    Head: Normocephalic and atraumatic.    Neck: Normal range of motion.     Pulmonary/Chest: Effort normal. No respiratory distress.   Abdominal: Non-distended.  : See below.  Integumentary: No rash or lesions visualized.  Musculoskeletal: Normal range of motion.    Neurological: Alert, grossly intact.  Psychiatric: Normal mood and affect. Thought content normal.      LABORATORY REVIEW:   Lab Results   Component Value Date    BUN 13 10/24/2024    CREATININE 1.32 (H) 10/24/2024    EGFR 56 (L) 10/24/2024     10/24/2024    K 3.8 10/24/2024     10/24/2024    CO2 25 10/24/2024    CALCIUM 8.9 10/24/2024      Lab Results   Component Value Date    WBC 5.2 10/24/2024    RBC 3.77 (L) 10/24/2024    HGB 11.4 (L) 10/24/2024    HCT 34.2 (L) 10/24/2024    MCV 91 10/24/2024    MCH 30.2 10/24/2024    MCHC 33.3 10/24/2024    RDW 12.4 10/24/2024     10/24/2024        Lab Results   Component Value Date    PSA 1.94 10/23/2024             Assessment:      1. OAB (overactive bladder)  Referral to Physical Therapy    trospium (Sanctura) 20 mg tablet      2. Urinary frequency  Referral to Urology    Post-Void Residual    POCT UA Automated manually resulted          Edis Vogt is a 79 y.o. with significant LUTS including sensation of incomplete emptying, frequency, urgency and urge incontinence  On tamsulosin since at least  2018  IPSS 25, QOL 5  UA today + trace ketones      Mixed irritative and obstructive symptoms with some mild-mod retention, likely a combination of BPH and OAB; agreeable to plan as below  May also consider addition of finasteride, minimally invasive procedure for BPH     Plan:   Continue tamsulosin 0.4 mg po daily  Trial of trospium 20 mg po q hs  Refer to pelvic floor PT   RTC in 6-8 weeks; prefers to follow up closer to home in [place]   Encouraged to contact us in the interim with any questions, concerns

## 2024-12-03 NOTE — PROGRESS NOTES
1m Prior ER visit for 36hr sleep, possible stroke. 3d hospital stay, normal tests, 1.32 Creatinine.     Urinary frequency and strong urgency. No issues with stream, start/stop 20s after initial voiding.     Cannot force self to go.

## 2024-12-03 NOTE — LETTER
December 4, 2024     Lee Potts DO  [address] [facility] [address]    Patient: Edis Vogt   YOB: 1945   Date of Visit: 12/3/2024       Dear Dr. Lee Potts, DO:    Thank you for referring Edis Vogt to me for evaluation. Below are my notes for this consultation.  If you have questions, please do not hesitate to call me. I look forward to following your patient along with you.       Sincerely,     Suresh Nagel, APRN-CNP      CC: No Recipients  ______________________________________________________________________________________    UROLOGIC INITIAL EVALUATION     PROBLEM LIST:  1. OAB (overactive bladder)        2. Urinary frequency  Referral to Urology    Post-Void Residual    POCT UA Automated manually resulted           HISTORY OF PRESENT ILLNESS:   Edis Vogt is a 79 y.o. with CAD s/p CABG x 3, CKD 3a, DM2  Kindly referred by PCP for evaluation and management of urinary frequency  Seen accompanied by daughter, Zabrina  Reports urinary frequency x years  Also with significant urgency, double voiding  NTF 1-2, sometimes wakes due to incontinence  Has started to wear a diaper, sleeps on a towel  Difficulty sleeping but can't take sleep aid - will sleep through urge  No daytime incontinence  No hematuria or dysuria  Years of diarrhea as well and some bowel incontinence   Questran causes constipation, takes Metamucil as needed  Drinking 40 oz water/day with Liquid IV    Raised in [email or web address]  Retired, worked in insurance sales  Father - stroke  Mother - dementia    PAST MEDICAL HISTORY:  Past Medical History:   Diagnosis Date   • Other chest pain 02/10/2022    Chest tightness   • Other conditions influencing health status 01/06/2018    History of cough   • Other hypotension 02/10/2022    Hypotension due to hypovolemia   • Personal history of other diseases of the circulatory system 12/30/2021    History of abnormal electrocardiography   •  Personal history of other diseases of the respiratory system 01/06/2018    History of influenza       PAST SURGICAL HISTORY:  Past Surgical History:   Procedure Laterality Date   • OTHER SURGICAL HISTORY  01/06/2022    Colonoscopy   • OTHER SURGICAL HISTORY  01/06/2022    Appendectomy   • OTHER SURGICAL HISTORY  01/06/2022    Coronary artery bypass graft   • OTHER SURGICAL HISTORY  12/30/2021    Bypass        ALLERGIES:   No Known Allergies     MEDICATIONS:   Current Outpatient Medications on File Prior to Visit   Medication Sig Dispense Refill   • aspirin 81 mg chewable tablet Chew 1 tablet (81 mg) once daily.     • atorvastatin (Lipitor) 80 mg tablet Take 1 tablet (80 mg) by mouth once daily at bedtime.     • cholestyramine (Questran) 4 gram packet Take 1 packet (4 g) by mouth if needed.     • donepezil (Aricept) 5 mg tablet Take 1 tablet (5 mg) by mouth once daily at bedtime.     • famotidine (Pepcid) 40 mg tablet Take 1 tablet (40 mg) by mouth once daily.     • gabapentin (Neurontin) 600 mg tablet Take 2 tablets (1,200 mg) by mouth twice a day.     • losartan (Cozaar) 50 mg tablet Take 1 tablet (50 mg) by mouth once daily. 90 tablet 3   • magnesium oxide (Mag-Ox) 400 mg (241.3 mg magnesium) tablet Take 1 tablet (400 mg) by mouth once daily.     • metFORMIN (Glucophage) 1,000 mg tablet Take 1 tablet (1,000 mg) by mouth 2 times daily (morning and late afternoon).     • metoprolol tartrate (Lopressor) 25 mg tablet Take 1 tablet (25 mg) by mouth 2 times a day. 180 tablet 3   • mirtazapine (Remeron) 30 mg tablet Take 1 tablet (30 mg) by mouth once daily at bedtime.     • multivitamin with minerals tablet Take 1 tablet by mouth once daily.     • psyllium (Metamucil) 3.4 gram packet Take 1 packet by mouth once daily as needed (for constipation).     • tamsulosin (Flomax) 0.4 mg 24 hr capsule Take 1 capsule (0.4 mg) by mouth once daily.     • nitroglycerin (Nitrostat) 0.4 mg SL tablet Place 1 tablet (0.4 mg) under the  tongue every 5 minutes if needed for chest pain. (Patient not taking: Reported on 12/3/2024)       No current facility-administered medications on file prior to visit.        SOCIAL HISTORY:  Patient  reports that he has never smoked. His smokeless tobacco use includes chew. He reports that he does not currently use alcohol. He reports that he does not currently use drugs.   Social History     Socioeconomic History   • Marital status:      Spouse name: Not on file   • Number of children: Not on file   • Years of education: Not on file   • Highest education level: Not on file   Occupational History   • Not on file   Tobacco Use   • Smoking status: Never   • Smokeless tobacco: Current     Types: Chew   Substance and Sexual Activity   • Alcohol use: Not Currently   • Drug use: Not Currently   • Sexual activity: Defer   Other Topics Concern   • Not on file   Social History Narrative   • Not on file     Social Drivers of Health     Financial Resource Strain: Low Risk  (11/27/2024)    Received from [facility]    Overall Financial Resource Strain (CARDIA)    • Difficulty of Paying Living Expenses: Not hard at all   Food Insecurity: No Food Insecurity (11/27/2024)    Received from [facility]    Hunger Vital Sign    • Worried About Running Out of Food in the Last Year: Never true    • Ran Out of Food in the Last Year: Never true   Transportation Needs: No Transportation Needs (11/27/2024)    Received from [facility]    PRAPARE - Transportation    • Lack of Transportation (Medical): No    • Lack of Transportation (Non-Medical): No   Physical Activity: Inactive (11/27/2024)    Received from [facility]    Exercise Vital Sign    • Days of Exercise per Week: 0 days    • Minutes of Exercise per Session: 0 min   Stress: No Stress Concern Present (11/27/2024)    Received from [facility]    [place] of Occupational Health - Occupational Stress Questionnaire    • Feeling of Stress : Only a  little   Social Connections: Moderately Isolated (11/27/2024)    Received from [facility]    Social Connection and Isolation Panel [NHANES]    • Frequency of Communication with Friends and Family: More than three times a week    • Frequency of Social Gatherings with Friends and Family: Once a week    • Attends Baptist Services: Never    • Active Member of Clubs or Organizations: No    • Attends Club or Organization Meetings: 1 to 4 times per year    • Marital Status:    Intimate Partner Violence: Not At Risk (10/23/2024)    Humiliation, Afraid, Rape, and Kick questionnaire    • Fear of Current or Ex-Partner: No    • Emotionally Abused: No    • Physically Abused: No    • Sexually Abused: No   Housing Stability: Low Risk  (11/27/2024)    Received from [facility]    Housing Stability Vital Sign    • Unable to Pay for Housing in the Last Year: No    • Number of Times Moved in the Last Year: 0    • Homeless in the Last Year: No       FAMILY HISTORY:  No family history on file.    REVIEW OF SYSTEMS:  All systems reviewed, pertinent negatives as noted in HPI.     PHYSICAL EXAM:  Visit Vitals  /65   Pulse 83   Temp 36.3 °C (97.3 °F) (Temporal)   Resp 25     Constitutional: Well-developed and well-nourished. No distress.    Head: Normocephalic and atraumatic.    Neck: Normal range of motion.     Pulmonary/Chest: Effort normal. No respiratory distress.   Abdominal: Non-distended.  : See below.  Integumentary: No rash or lesions visualized.  Musculoskeletal: Normal range of motion.    Neurological: Alert, grossly intact.  Psychiatric: Normal mood and affect. Thought content normal.      LABORATORY REVIEW:   Lab Results   Component Value Date    BUN 13 10/24/2024    CREATININE 1.32 (H) 10/24/2024    EGFR 56 (L) 10/24/2024     10/24/2024    K 3.8 10/24/2024     10/24/2024    CO2 25 10/24/2024    CALCIUM 8.9 10/24/2024      Lab Results   Component Value Date    WBC 5.2 10/24/2024    RBC 3.77  (L) 10/24/2024    HGB 11.4 (L) 10/24/2024    HCT 34.2 (L) 10/24/2024    MCV 91 10/24/2024    MCH 30.2 10/24/2024    MCHC 33.3 10/24/2024    RDW 12.4 10/24/2024     10/24/2024        Lab Results   Component Value Date    PSA 1.94 10/23/2024             Assessment:      1. OAB (overactive bladder)  Referral to Physical Therapy    trospium (Sanctura) 20 mg tablet      2. Urinary frequency  Referral to Urology    Post-Void Residual    POCT UA Automated manually resulted          Edis Vogt is a 79 y.o. with significant LUTS including sensation of incomplete emptying, frequency, urgency and urge incontinence  On tamsulosin since at least 2018  IPSS 25, QOL 5  UA today + trace ketones      Mixed irritative and obstructive symptoms with some mild-mod retention, likely a combination of BPH and OAB; agreeable to plan as below  May also consider addition of finasteride, minimally invasive procedure for BPH     Plan:   Continue tamsulosin 0.4 mg po daily  Trial of trospium 20 mg po q hs  Refer to pelvic floor PT   RTC in 6-8 weeks; prefers to follow up closer to home in [place]   Encouraged to contact us in the interim with any questions, concerns         1m Prior ER visit for 36hr sleep, possible stroke. 3d hospital stay, normal tests, 1.32 Creatinine.     Urinary frequency and strong urgency. No issues with stream, start/stop 20s after initial voiding.     Cannot force self to go.

## 2024-12-04 ENCOUNTER — TELEPHONE (OUTPATIENT)
Dept: CARDIOLOGY | Facility: CLINIC | Age: 79
End: 2024-12-04
Payer: MEDICARE

## 2024-12-04 NOTE — TELEPHONE ENCOUNTER
----- Message from Sandhya Gaytan sent at 12/3/2024  7:42 PM EST -----  Patient had monitor with paroxysmal atrial fibrillation. Make sure he and his family are aware and that he has follow-up to discuss possible oral anticoagulation.

## 2024-12-05 ENCOUNTER — EVALUATION (OUTPATIENT)
Dept: PHYSICAL THERAPY | Facility: CLINIC | Age: 79
End: 2024-12-05
Payer: MEDICARE

## 2024-12-05 DIAGNOSIS — R39.15 URINARY URGENCY: ICD-10-CM

## 2024-12-05 DIAGNOSIS — M62.838 MUSCLE SPASM: Primary | ICD-10-CM

## 2024-12-05 DIAGNOSIS — N32.81 OAB (OVERACTIVE BLADDER): ICD-10-CM

## 2024-12-05 DIAGNOSIS — M62.81 MUSCLE WEAKNESS: ICD-10-CM

## 2024-12-05 DIAGNOSIS — N39.41 URGE INCONTINENCE OF URINE: ICD-10-CM

## 2024-12-05 PROCEDURE — 97112 NEUROMUSCULAR REEDUCATION: CPT | Mod: GP

## 2024-12-05 PROCEDURE — 97161 PT EVAL LOW COMPLEX 20 MIN: CPT | Mod: GP

## 2024-12-05 PROCEDURE — 97535 SELF CARE MNGMENT TRAINING: CPT | Mod: GP

## 2024-12-05 ASSESSMENT — PAIN SCALES - GENERAL: PAINLEVEL_OUTOF10: 2

## 2024-12-05 ASSESSMENT — ENCOUNTER SYMPTOMS
DEPRESSION: 0
OCCASIONAL FEELINGS OF UNSTEADINESS: 0
LOSS OF SENSATION IN FEET: 0

## 2024-12-05 ASSESSMENT — PAIN - FUNCTIONAL ASSESSMENT: PAIN_FUNCTIONAL_ASSESSMENT: 0-10

## 2024-12-05 NOTE — PROGRESS NOTES
Physical Therapy    Physical Therapy Evaluation    Patient Name: Edis Vogt  MRN: [id number]  Today's Date: 2024  Last name and  confirmed verbally by patient.    Time Entry:   Time Calculation  Start Time: 1320  Stop Time: 1420  Time Calculation (min): 60 min  PT Evaluation Time Entry  PT Evaluation (Low) Time Entry: 30  PT Therapeutic Procedures Time Entry  Neuromuscular Re-Education Time Entry: 20  Self-Care/Home Mgmt Training: 10                    Reason for Visit:  Referred by: Surehs Nagel  Referral diagnosis: N32.81 (ICD-10-CM) - OAB (overactive bladder)     Insurance:  Visit: #1  Authorized: MEDICARE A&B MED % COVERAGE, AARP SUPP. MED NEC NO AUTH ($265.72 USED PT/ST)   Payor/Plan: Payor: MEDICARE / Plan: MEDICARE PART A AND B / Product Type: *No Product type* /     Current Problem  1. Muscle spasm  Follow Up In Physical Therapy      2. OAB (overactive bladder)  Referral to Physical Therapy      3. Muscle weakness  Follow Up In Physical Therapy      4. Urinary urgency  Follow Up In Physical Therapy      5. Urge incontinence of urine  Follow Up In Physical Therapy          Assessment   Edis Vogt presents with chief complaint of urinary urgency, urge urinary incontinence, and feeling of incomplete bladder emptying. Functional limitations include bladder function, bowel function. Impairments include range of motion, strength, independence in exercise, tissue tenderness. Patient demonstrates decreased range of motion overall of pelvic floor with weakness and decreased ability to lengthen. Heavy accessory use of synergistic muscles when asked to contract pelvic floor. Poor bladder habits overall.  Skilled PT required to return to prior level of function and maximize functional outcome. Likely to benefit from skilled care.    Plan  Treatment/Interventions: Biofeedback, Blood flow restriction therapy, Dry needling, Education/ Instruction, Electrical stimulation, Gait training, Manual  therapy, Neuromuscular re-education, Self care/ home management, Taping techniques, Therapeutic activities, Therapeutic exercises, Ultrasound  PT Plan: Skilled PT  PT Frequency: 1 time per week  Duration: 12 weeks  Onset Date: 12/05/23  Certification Period Start Date: 12/05/24  Certification Period End Date: 03/05/25  Rehab Potential: Fair  Plan of Care Agreement: Patient    Next Visit Plan: toilet positioning, ortho screen, review diaphragmatic breathing/lengthening, review education, diet?, nocturia education      Goals:  Patient will demonstrate independence and report adherence with home exercise program (HEP) to facilitate independent rehab program upon discharge to maximize functional gains.  NIH CPSI will improve by at least 6 points. Baseline: 15  Patient to report 50% improvement in urge incontinence to demonstrate improved pelvic floor strength and bladder control.  Patient to practice diaphragmatic breathing and pelvic floor relaxation independently for improved range of motion and bladder control.  Patient to increase 30 second sit to stand test to demonstrate improved strength of bilateral lower extremities and decreased fall risk. Baseline: 7  Patient to report 50% improvement in controlling urge for improved confidence in community and social settings.    Subjective   Date of Onset: Years  Chief Complaint: Doctor told to come in. Urination problems. Has to wear depends at night because urinates in bed. Has urgency. Doesn't fully empty. Double voids. Leaking with urgency, cold weather makes worse.   Relevant PMH: diabetes mellitus, cardiac, hypertension, osteoarthritis, rheumatoid arthritis, kidney disease, urinary tract infection  Relevant PSH: coronary artery bypass graft  Red flags: Do you have any of the following? No   Fever/chills, unexplained weight changes, dizziness/fainting, unexplained change in bowel or bladder functions, unexplained malaise or muscle weakness, night pain/sweats, numbness  "or tingling  Occupation: Retired  Exercise: None (because of back)  Patient stated goals for treatment: cure problem    Bladder Screen:  Daytime voiding: every 4 hours at most  Nighttime voidin-1  Leakage (amount, protection, activity): leaks at night 2-3x per week, during the day with urgency 1x a week  Urgency: all the time  Difficulty initiating stream: sometimes, \"goes twice all the time\"  Incomplete emptying: yes  Dysuria: no    Water intake: 30-40 oz with hydration packet  Other beverages (type, amount): no    Bowel Screen:  Constipation/straining: sometimes if too much fiber  Diarrhea: yes  [place] stool scale: 1,2,7  Fiber/stool softeners: fiber supplement (thinks)  Fecal incontinence: no    Sexual health screen:  Currently sexually active: not sexually active, no erections     Barriers Impacting Care:  Chronicity of impairments, multiple comorbidities    Precautions:  Precautions  STEADI Fall Risk Score (The score of 4 or more indicates an increased risk of falling): 4      Pain:  Pain Assessment: 0-10  0-10 (Numeric) Pain Score: 2 (Worse w/ walking)  Pain Location: Back        Objective   Additional Verbalized Informed Consent Explained by Treating Therapist: Yes  Patient Understanding of Examination Techniques:  The patient has been informed about the various examination techniques to be utilized today, including both external and internal methods. A detailed explanation of the purpose and benefits of these techniques has been provided, ensuring the patient has a clear understanding of each aspect.    Consent and Autonomy:  The patient understands that consent for the examination is an ongoing process. They have the right to withdraw consent and terminate the examination at any point should they feel uncomfortable or wish to discontinue for any reason.  The patient acknowledges the importance of communication during the examination and agrees to promptly inform the examiner if they experience any " discomfort at any time. This will allow for immediate adjustments to be made to ensure their comfort and safety.    Alternatives to Internal Examination:  The patient is aware of alternative options to internal examinations, includin. Education and Instruction: Providing information and guidance without performing an internal exam.  2. External Examination: Conducting a pelvic exam while the patient remains clothed or partially clothed.  3. No Examination: The option to forgo the examination entirely if the patient chooses.  The patient has confirmed their understanding of these alternatives and their implications.    Conclusion:  The patient’s consent is based on a thorough understanding of the risks, benefits, and purposes of the examination techniques discussed today. They have been encouraged to ask questions and express any concerns they may have, ensuring an informed and collaborative approach to their care.    Patient agrees to internal assessment    Ortho:  Posture:  Forward head, rounded shoulders  Pelvic Floor:    Range of motion:  Voluntary contraction: yes  Voluntary relaxation: difficulty at first, improves with cueing  Involuntary contraction: no  Paradoxical contraction: no    Strength/coordination:  Accessory muscle use: glutes, holds breath    Internal palpation:   Tenderness/hyperactivity, unable to pass external anal sphincter due to patient discomfort    Tonicity:   Mod tone    Rib cage mobility:  Diaphragmatic breathing fair, accessory muscle use for deep breathing, improves with cueing    Outcome Measures:  Other Measures  30 Second Sit to Stand: 7  Other Outcome Measures: NIH-CPSI = 15     TREATMENT  Neuromuscular reeducation:  Educated patient on pelvic floor anatomy and relationship to patient's specific diagnosis  Educated patient on role of physical therapy and plan of care  Educated patient on muscle tension vs weakness vs incoordination  Instructed proper diaphragmatic breathing  form  Instructed proper pelvic floor contraction and lengthening  *diaphragmatic breathing with pelvic floor lengthening in supine hooklying 3x10 daily    Self-care/home management training:  Bladder habits - increasing water intake, normal voiding intervals  *Bladder diary  *urge suppression with kegels    Education:  *HEP: Patient verbalizes and demonstrates understanding of home exercises. Patient will contact PT with questions or if condition worsens.      Laura Beckett, PT, DPT

## 2024-12-09 ENCOUNTER — APPOINTMENT (OUTPATIENT)
Dept: CARDIOLOGY | Facility: CLINIC | Age: 79
End: 2024-12-09
Payer: MEDICARE

## 2024-12-09 ENCOUNTER — OFFICE VISIT (OUTPATIENT)
Dept: CARDIOLOGY | Facility: CLINIC | Age: 79
End: 2024-12-09
Payer: MEDICARE

## 2024-12-09 ENCOUNTER — HOSPITAL ENCOUNTER (OUTPATIENT)
Dept: RADIOLOGY | Facility: HOSPITAL | Age: 79
Discharge: HOME | End: 2024-12-09
Payer: MEDICARE

## 2024-12-09 VITALS
SYSTOLIC BLOOD PRESSURE: 124 MMHG | WEIGHT: 177 LBS | DIASTOLIC BLOOD PRESSURE: 62 MMHG | BODY MASS INDEX: 26.22 KG/M2 | HEART RATE: 73 BPM | HEIGHT: 69 IN

## 2024-12-09 DIAGNOSIS — G45.9 TRANSIENT CEREBRAL ISCHEMIA, UNSPECIFIED TYPE: ICD-10-CM

## 2024-12-09 DIAGNOSIS — Z79.01 CHRONIC ANTICOAGULATION: ICD-10-CM

## 2024-12-09 DIAGNOSIS — R40.4 TRANSIENT ALTERATION OF AWARENESS: ICD-10-CM

## 2024-12-09 DIAGNOSIS — I48.0 PAROXYSMAL ATRIAL FIBRILLATION (MULTI): Primary | ICD-10-CM

## 2024-12-09 PROCEDURE — 1159F MED LIST DOCD IN RCRD: CPT | Performed by: INTERNAL MEDICINE

## 2024-12-09 PROCEDURE — 99213 OFFICE O/P EST LOW 20 MIN: CPT | Performed by: INTERNAL MEDICINE

## 2024-12-09 PROCEDURE — 2550000001 HC RX 255 CONTRASTS

## 2024-12-09 PROCEDURE — 1157F ADVNC CARE PLAN IN RCRD: CPT | Performed by: INTERNAL MEDICINE

## 2024-12-09 PROCEDURE — 70496 CT ANGIOGRAPHY HEAD: CPT | Performed by: RADIOLOGY

## 2024-12-09 PROCEDURE — 1160F RVW MEDS BY RX/DR IN RCRD: CPT | Performed by: INTERNAL MEDICINE

## 2024-12-09 PROCEDURE — 70498 CT ANGIOGRAPHY NECK: CPT

## 2024-12-09 PROCEDURE — 3078F DIAST BP <80 MM HG: CPT | Performed by: INTERNAL MEDICINE

## 2024-12-09 PROCEDURE — 70498 CT ANGIOGRAPHY NECK: CPT | Performed by: RADIOLOGY

## 2024-12-09 PROCEDURE — 4004F PT TOBACCO SCREEN RCVD TLK: CPT | Performed by: INTERNAL MEDICINE

## 2024-12-09 PROCEDURE — 3074F SYST BP LT 130 MM HG: CPT | Performed by: INTERNAL MEDICINE

## 2024-12-09 PROCEDURE — G2211 COMPLEX E/M VISIT ADD ON: HCPCS | Performed by: INTERNAL MEDICINE

## 2024-12-09 ASSESSMENT — ENCOUNTER SYMPTOMS
DIFFICULTY URINATING: 1
SHORTNESS OF BREATH: 0
CONFUSION: 1
CARDIOVASCULAR NEGATIVE: 1
GASTROINTESTINAL NEGATIVE: 1
DYSURIA: 0
HEMATURIA: 1
ARTHRALGIAS: 1

## 2024-12-09 NOTE — PROGRESS NOTES
Patient:  Edis Vogt  YOB: 1945  MRN: [id number]       Chief Complaint/Active Symptoms:       Edis Vogt is a 79 y.o. male who returns today for cardiac follow-up.    The patient returns with his daughter given results of his monitor.  His monitor demonstrated paroxysmal atrial fibrillation with the longest episode lasting little bit more than 10 minutes.  There was also other episodes of SVT that the reading physician thought could have been atrial fibrillation.  Patient had no symptoms during these episodes.    He said no further neurological events since he was last seen.  He has no chest discomfort shortness of breath, dizziness or lightheadedness.  He has had no major bleeding episodes or GI bleeds.    Review of Systems   Respiratory:  Negative for shortness of breath.    Cardiovascular: Negative.    Gastrointestinal: Negative.    Genitourinary:  Positive for difficulty urinating and hematuria. Negative for dysuria.   Musculoskeletal:  Positive for arthralgias.   Psychiatric/Behavioral:  Positive for confusion (Memory deficits).    All other systems reviewed and are negative.      Objective:     Vitals:    12/09/24 1550   BP: 124/62   Pulse: 73       Vitals:    12/09/24 1550   Weight: 80.3 kg (177 lb)       Allergies:     No Known Allergies       Medications:     Current Outpatient Medications   Medication Instructions    aspirin 81 mg, Daily RT    atorvastatin (LIPITOR) 80 mg, Nightly    cholestyramine (Questran) 4 gram packet 1 packet, As needed    donepezil (ARICEPT) 5 mg, Nightly    famotidine (PEPCID) 40 mg, Daily    gabapentin (NEURONTIN) 1,200 mg, 2 times daily    losartan (COZAAR) 50 mg, oral, Daily    magnesium oxide (MAG-OX) 400 mg, Daily    metFORMIN (GLUCOPHAGE) 1,000 mg, 2 times daily (morning and late afternoon)    metoprolol tartrate (LOPRESSOR) 25 mg, oral, 2 times daily    mirtazapine (REMERON) 30 mg, Nightly    multivitamin with minerals tablet 1 tablet, Daily RT     nitroglycerin (NITROSTAT) 0.4 mg, Every 5 min PRN    psyllium (Metamucil) 3.4 gram packet 1 packet, Daily PRN    tamsulosin (FLOMAX) 0.4 mg, Daily    trospium (SANCTURA) 20 mg, oral, Nightly       Physical Examination:   GENERAL:  Well developed, well nourished, in no acute distress.  NECK:  Supple, no JVD, no bruit.  CHEST:  Symmetric and nontender.  LUNGS:  Clear to auscultation bilaterally, normal respiratory effort.  HEART:  PMI is nondisplaced. RRR with normal S1 and S2, no S3, no mumur or rub. No carotid or abdominal bruits  EXTREMITIES:  Warm with good color, no clubbing or cyanosis.  There is no edema noted.  NEURO/PSYCH:  Alert and oriented times three with approppriate behavior and responses. Nonfocal motor examination with normal gait and ambulation      Lab:     CBC:   Lab Results   Component Value Date    WBC 5.2 10/24/2024    RBC 3.77 (L) 10/24/2024    HGB 11.4 (L) 10/24/2024    HCT 34.2 (L) 10/24/2024     10/24/2024        CMP:    Lab Results   Component Value Date     10/24/2024    K 3.8 10/24/2024     10/24/2024    CO2 25 10/24/2024    BUN 13 10/24/2024    CREATININE 1.32 (H) 10/24/2024    GLUCOSE 123 (H) 10/24/2024    CALCIUM 8.9 10/24/2024       Magnesium:    Lab Results   Component Value Date    MG 1.43 (L) 10/24/2024       Lipid Profile:    Lab Results   Component Value Date    TRIG 100 10/23/2024    HDL 29.4 10/23/2024    LDLCALC 60 10/23/2024       TSH:    Lab Results   Component Value Date    TSH 1.84 10/23/2024       BNP:   Lab Results   Component Value Date     (H) 10/23/2024        PT/INR:    Lab Results   Component Value Date    PROTIME 13.8 (H) 06/07/2022    INR 1.2 (H) 06/07/2022       HgBA1c:    Lab Results   Component Value Date    HGBA1C 6.9 (H) 10/23/2024       BMP:  Lab Results   Component Value Date     10/24/2024     10/23/2024     06/08/2022     06/07/2022     01/11/2022    K 3.8 10/24/2024    K 3.9 10/23/2024    K 3.7  06/08/2022    K 4.3 06/07/2022    K 4.9 01/11/2022     10/24/2024     10/23/2024     06/08/2022     06/07/2022     01/11/2022    CO2 25 10/24/2024    CO2 23 10/23/2024    CO2 22 06/08/2022    CO2 26 06/07/2022    CO2 31 01/11/2022    BUN 13 10/24/2024    BUN 14 10/23/2024    BUN 17 06/08/2022    BUN 22 06/07/2022    BUN 21 01/11/2022    CREATININE 1.32 (H) 10/24/2024    CREATININE 1.26 10/23/2024    CREATININE 1.37 (H) 06/08/2022    CREATININE 1.63 (H) 06/07/2022    CREATININE 1.59 (H) 01/11/2022       Cardiac Enzymes:    Lab Results   Component Value Date    TROPHS 6 10/23/2024    TROPHS 5 10/23/2024    TROPHS 7 06/07/2022       Hepatic Function Panel:    Lab Results   Component Value Date    ALKPHOS 78 10/23/2024    ALT 17 10/23/2024    AST 18 10/23/2024    PROT 6.3 (L) 10/23/2024    BILITOT 0.8 10/23/2024         Diagnostic Studies:     Transthoracic Echo (TTE) Complete    Result Date: 10/24/2024            [facility] [id number] [id number]    Tel [phone] Fax [phone] TRANSTHORACIC ECHOCARDIOGRAM REPORT Patient Name:      CAITLIN LUNA      Reading Physician:    [id number] Olegario Howard MD Study Date:        10/24/2024          Ordering Provider:    [id number] SUSANA SHEPHERD MRN/PID:           [id number]            Fellow: Accession#:        [id number]        Nurse:                Sonia Asher Date of Birth/Age: 1945 / 79 years Sonographer:          Hayley Giron RDCS Gender:            M                   Additional Staff: Height:            175.26 cm           Admit Date: Weight:            74.84 kg            Admission Status:     Observation -                                                              Priority discharge BSA / BMI:         1.90 m2 / 24.37     Department Location:  [facility] Echo Lab                    kg/m2 Blood  Pressure: 163 /77 mmHg Study Type:    TRANSTHORACIC ECHO (TTE) COMPLETE Diagnosis/ICD: Transient alteration of awareness-R40.4 Indication:    stroke rule out CPT Codes:     Echo Complete w Full [id number] Patient History: CABG:              CABG x 3. Diabetes:          Yes Pertinent History: HTN. Study Detail: The following Echo studies were performed: 2D, M-Mode, Doppler and               color flow. Image quality for this study is adequate. Agitated               saline used as a contrast agent for intraseptal flow evaluation.  PHYSICIAN INTERPRETATION: Left Ventricle: Left ventricular ejection fraction is normal, by visual estimate at 60-65%. There are no regional wall motion abnormalities. The left ventricular cavity size is normal. Spectral Doppler shows an impaired relaxation pattern of left ventricular diastolic filling. Left Atrium: The left atrium is normal in size. Right Ventricle: The right ventricle is normal in size. There is normal right ventricular global systolic function. Right Atrium: The right atrium is normal in size. Aortic Valve: The aortic valve is trileaflet. There is mild to moderate aortic valve cusp calcification. There is evidence of mildly elevated transaortic gradients consistent with sclerosis of the aortic valve Patients with severe VHD should be evaluated by a multidisciplinary Heart Valve Team when intervention is considered. (Class I, Level of Evidence: C) Consultation with or referral to a Heart Valve Center of Excellence is reasonable when discussing treatment options for 1) asymptomatic patients with severe VHD, 2) patients who may benefit from valve repair versus valve replacement, or 3) patients with multiple comorbidities for whom valve intervention is considered. (Class IIb, Level of Evidence: C) Circulation. 2014 [place]manuel 10;129(23):2440-92. The aortic valve dimensionless index is 0.52. There is no evidence of aortic valve regurgitation. The peak instantaneous gradient of the  aortic valve is 11.4 mmHg. The mean gradient of the aortic valve is 6.0 mmHg. Mitral Valve: The mitral valve is normal in structure. There is no evidence of mitral valve regurgitation. Tricuspid Valve: The tricuspid valve is structurally normal. There is trace tricuspid regurgitation. The Doppler estimated RVSP is within normal limits at 20.6 mmHg. Pulmonic Valve: The pulmonic valve is structurally normal. There is mild pulmonic valve regurgitation. Pericardium: No pericardial effusion noted. Aorta: The aortic root is normal. There is no dilatation of the ascending aorta. There is no dilatation of the aortic root. Pulmonary Artery: The Doppler estimated pulmonary artery diastolic pressure is 8.7 mmHg. Systemic Veins: The inferior vena cava appears normal in size, with IVC inspiratory collapse less than 50%.  CONCLUSIONS:  1. Left ventricular ejection fraction is normal, by visual estimate at 60-65%.  2. Spectral Doppler shows an impaired relaxation pattern of left ventricular diastolic filling.  3. There is normal right ventricular global systolic function.  4. Right ventricular within normal limits.  5. Aortic valve sclerosis. QUANTITATIVE DATA SUMMARY:  2D MEASUREMENTS:           Normal Ranges: LAs:             3.60 cm   (2.7-4.0cm) IVSd:            1.17 cm   (0.6-1.1cm) LVPWd:           1.00 cm   (0.6-1.1cm) LVIDd:           3.50 cm   (3.9-5.9cm) LVIDs:           2.27 cm LV Mass Index:   61.2 g/m2 LV % FS          35.1 %  LA VOLUME:                  Normal Ranges: LA Area A4C:      9.5 cm2 LA Area A2C:      6.7 cm2 LA Volume Index:  8.4 ml/m2 LA Vol A4C:       18.0 ml LA Vol A2C:       11.0 ml LA Vol Index BSA: 7.6 ml/m2 LA Vol BP:        16.0 ml  M-MODE MEASUREMENTS:         Normal Ranges: Ao Root:             3.00 cm (2.0-3.7cm) AoV Exc:             1.40 cm (1.5-2.5cm)  AORTA MEASUREMENTS:         Normal Ranges: AoV Exc:            1.40 cm (1.5-2.5cm) Ao Sinus, d:        3.40 cm (2.1-3.5cm) Ao STJ, d:           2.70 cm (1.7-3.4cm) Asc Ao, d:          3.00 cm (2.1-3.4cm)  LV SYSTOLIC FUNCTION BY 2D PLANIMETRY (MOD):                      Normal Ranges: EF-A4C View:    61 % (>=55%) EF-A2C View:    67 % EF-Biplane:     64 % EF-Visual:      63 % LV EF Reported: 63 %  LV DIASTOLIC FUNCTION:           Normal Ranges: MV Peak E:             0.81 m/s  (0.7-1.2 m/s) MV Peak A:             0.96 m/s  (0.42-0.7 m/s) E/A Ratio:             0.84      (1.0-2.2) MV e'                  0.046 m/s (>8.0) MV lateral e'          0.05 m/s MV medial e'           0.04 m/s E/e' Ratio:            17.66     (<8.0)  MITRAL VALVE:          Normal Ranges: MV DT:        306 msec (150-240msec)  AORTIC VALVE:                      Normal Ranges: AoV Vmax:                1.69 m/s  (<=1.7m/s) AoV Peak P.4 mmHg (<20mmHg) AoV Mean P.0 mmHg  (1.7-11.5mmHg) LVOT Max Chadwick:            1.06 m/s  (<=1.1m/s) AoV VTI:                 34.50 cm  (18-25cm) LVOT VTI:                18.10 cm LVOT Diameter:           1.90 cm   (1.8-2.4cm) AoV Area, VTI:           1.41 cm2  (2.5-5.5cm2) AoV Area,Vmax:           1.69 cm2  (2.5-4.5cm2) AoV Dimensionless Index: 0.52  RIGHT VENTRICLE: RV Basal 3.50 cm RV Mid   2.80 cm RV Major 6.5 cm TAPSE:   16.0 mm RV s'    0.10 m/s  TRICUSPID VALVE/RVSP:          Normal Ranges: Peak TR Velocity:     2.10 m/s RV Syst Pressure:     21 mmHg  (< 30mmHg)  PULMONIC VALVE:          Normal Ranges: PV Accel Time:  155 msec (>120ms) PIEDV:          1.19 m/s PADP:           8.7 mmHg  [id number] Olegario Howard MD Electronically signed on 10/24/2024 at 1:06:12 PM  ** Final **      No nuclear medicine results found for the past 12 months    No valid procedures specified.    EKG:   The predominant rhythm was Sinus.  *The Maximum Heart Rate recorded was 207 bpm, 10/25 16:13:19, the Minimum Heart Rate recorded was 46 bpm,  07:22:57, and the Average Heart Rate was 76 bpm.  *There were 1,406 VE beats with a burden of <1 %.  *There were  27,581 SVE beats with a burden of 3 %. There were 184 occurrences of Supraventricular Tachycardia with the Fastest episode 207 bpm, 10/25 16:13:18, and the Longest episode 10m 40.1s, 10/25 22:01:09.  *There were 3 Patient Triggers.     Patient recorded for 10 days, 11 hours and 24 minutes.     Normal sinus rhythm with rates from 46 to 132 bpm with average of 76 bpm.     Common episodes of a supraventricular tachycardia, some of which appear to be episodes of atrial fibrillation.  On October 25 at 22: 0 1 in the evening there was an episode of a tachycardia for 10 minutes and 40 seconds that appear to be atrial fibrillation with a rapid trickle response.     No sustained pauses or bradycardias.     Slowest heart rate occurred on November 3 at 07:22 in the morning with sinus bradycardia at 46 bpm.     Episode of ventricular bigeminy occurring on November 1 at 07:06 in the morning.     3 patient triggers noted, 2 with normal sinus rhythm and 1 with artifact.     Episodes of paroxysmal atrial fibrillation appear to be present.    Radiology:     No orders to display     ASSESSMENT     Problem List Items Addressed This Visit       Paroxysmal atrial fibrillation (Multi) - Primary    Relevant Orders    CBC     Other Visit Diagnoses       Chronic anticoagulation        Relevant Orders    CBC            PLAN     1.  New diagnosis of paroxysmal atrial fibrillation asymptomatic.  Given his recent neurological event oral anticoagulation is indicated.  His [id number] score is 6.  I recommended discontinuing his aspirin and starting oral Eliquis 5 mg twice daily.  Samples and a discount card were provided to give us time if there is any issues to get this covered or find a suitable alternative.  Patient has an upcoming back injection procedure we have told the daughter to contact the physician performing the procedures office to see how long prior to the procedure and they want him to discontinue his anticoagulation.  If it is  more than 5 days he should just take his aspirin until 8 they told him to stop it for the procedure and then after the procedure start Eliquis.  There is more chance of a prothrombotic event starting it for a couple of days stopping it for a week and then restarting it.  We talked about the bleeding complications and what to do with minor major bleeding calling 911 and informing our office.  We talked about holding pressure oversights where he has labs drawn or superficial cuts in the home.  Additional information was provided in a written form both regarding the diagnosis of atrial fibrillation and anticoagulation use and stroke risk.    I have asked him to have a CBC in 3 months and follow-up in 6 months if there is no complications.

## 2024-12-09 NOTE — PATIENT INSTRUCTIONS
Stop aspirin when you start eliquis    Start eliquis 5 mg twice daily.     Check with pain management doctors about when to stop the eliquis prior to his back injection    Get a cbc in 3 months (order in your chart).

## 2024-12-12 ENCOUNTER — TREATMENT (OUTPATIENT)
Dept: PHYSICAL THERAPY | Facility: CLINIC | Age: 79
End: 2024-12-12
Payer: MEDICARE

## 2024-12-12 DIAGNOSIS — M62.838 MUSCLE SPASM: ICD-10-CM

## 2024-12-12 DIAGNOSIS — N39.41 URGE INCONTINENCE OF URINE: ICD-10-CM

## 2024-12-12 DIAGNOSIS — M62.81 MUSCLE WEAKNESS: Primary | ICD-10-CM

## 2024-12-12 DIAGNOSIS — R39.15 URINARY URGENCY: ICD-10-CM

## 2024-12-12 PROCEDURE — 97112 NEUROMUSCULAR REEDUCATION: CPT | Mod: GP

## 2024-12-12 PROCEDURE — 97535 SELF CARE MNGMENT TRAINING: CPT | Mod: GP

## 2024-12-12 ASSESSMENT — PAIN - FUNCTIONAL ASSESSMENT: PAIN_FUNCTIONAL_ASSESSMENT: 0-10

## 2024-12-12 NOTE — PROGRESS NOTES
Physical Therapy    Physical Therapy Treatment    Patient Name: Edis Vogt  MRN: [id number]  Today's Date: 2024    Time Entry:   Time Calculation  Start Time: 1200  Stop Time: 1255  Time Calculation (min): 55 min     PT Therapeutic Procedures Time Entry  Neuromuscular Re-Education Time Entry: 8  Self-Care/Home Mgmt Trainin                    Insurance:  Visit: #2  Authorized: MEDICARE A&B MED % COVERAGE, AARP SUPP. MED NEC NO AUTH ($265.72 USED PT/ST)   Payor/Plan: Payor: MEDICARE / Plan: MEDICARE PART A AND B / Product Type: *No Product type* /     Current Problem  1. Muscle weakness        2. Muscle spasm        3. Urinary urgency        4. Urge incontinence of urine            Assessment   Patient demonstrates improved awareness of bladder and bowel habits. Difficulty engaging pelvic floor without compensation of glutes that improves with cueing. Patient will continue to benefit from skilled physical therapy to improve function and meet therapy goals.    Plan  Next Visit Plan: finish ortho screen, review education, home exercise program, revisit nocturia      Goals:  Patient will demonstrate independence and report adherence with home exercise program (HEP) to facilitate independent rehab program upon discharge to maximize functional gains.  NIH CPSI will improve by at least 6 points. Baseline: 15  Patient to report 50% improvement in urge incontinence to demonstrate improved pelvic floor strength and bladder control.  Patient to practice diaphragmatic breathing and pelvic floor relaxation independently for improved range of motion and bladder control.  Patient to increase 30 second sit to stand test to demonstrate improved strength of bilateral lower extremities and decreased fall risk. Baseline: 7  Patient to report 50% improvement in controlling urge for improved confidence in community and social settings.    Subjective   Less diarrhea and no leaking since last visit.    Pain:  Pain  Assessment: 0-10  0-10 (Numeric) Pain Score:  (Pain level not discussed)           Objective   Additional Verbalized Informed Consent Explained by Treating Therapist: Yes  Patient Understanding of Examination Techniques:  The patient has been informed about the various examination techniques to be utilized today, including both external and internal methods. A detailed explanation of the purpose and benefits of these techniques has been provided, ensuring the patient has a clear understanding of each aspect.    Consent and Autonomy:  The patient understands that consent for the examination is an ongoing process. They have the right to withdraw consent and terminate the examination at any point should they feel uncomfortable or wish to discontinue for any reason.  The patient acknowledges the importance of communication during the examination and agrees to promptly inform the examiner if they experience any discomfort at any time. This will allow for immediate adjustments to be made to ensure their comfort and safety.    Alternatives to Internal Examination:  The patient is aware of alternative options to internal examinations, includin. Education and Instruction: Providing information and guidance without performing an internal exam.  2. External Examination: Conducting a pelvic exam while the patient remains clothed or partially clothed.  3. No Examination: The option to forgo the examination entirely if the patient chooses.  The patient has confirmed their understanding of these alternatives and their implications.    Conclusion:  The patient’s consent is based on a thorough understanding of the risks, benefits, and purposes of the examination techniques discussed today. They have been encouraged to ask questions and express any concerns they may have, ensuring an informed and collaborative approach to their care.    No internal work today    Strength:   Hip flexion: R 4+/5, L 4+/5  Hip internal rotation: R  5/5, L 5/5  Hip external rotation: R 4+/5, L 4+/5          TREATMENT    Neuromuscular reeducation:  Time for objective measurements  *diaphragmatic breathing with pelvic floor lengthening on inhale and contraction on exhale 3x10 daily      Self-care/home management:  Reviewed bladder diary - water intake backlogged in afternoon/evening, pt to try to spread out for decreased frequency/urgency  Diet and impact on diarrhea - pt to try cutting eggs out of diet  Urge suppression - pt to try quick flick kegels in addition to distraction  Toileting position - pt to try sitting down for better muscle relaxation and bladder emptying      Education:  Home exercise program: Reviewed and educated patient on additions/changes for home exercise program as above (*)     Laura Beckett, PT, DPT

## 2024-12-19 ENCOUNTER — TREATMENT (OUTPATIENT)
Dept: PHYSICAL THERAPY | Facility: CLINIC | Age: 79
End: 2024-12-19
Payer: MEDICARE

## 2024-12-19 DIAGNOSIS — N39.41 URGE INCONTINENCE OF URINE: ICD-10-CM

## 2024-12-19 DIAGNOSIS — M62.838 MUSCLE SPASM: Primary | ICD-10-CM

## 2024-12-19 DIAGNOSIS — R39.15 URINARY URGENCY: ICD-10-CM

## 2024-12-19 DIAGNOSIS — M62.81 MUSCLE WEAKNESS: ICD-10-CM

## 2024-12-19 DIAGNOSIS — N32.81 OAB (OVERACTIVE BLADDER): ICD-10-CM

## 2024-12-19 PROCEDURE — 97110 THERAPEUTIC EXERCISES: CPT | Mod: GP

## 2024-12-19 PROCEDURE — 97140 MANUAL THERAPY 1/> REGIONS: CPT | Mod: GP

## 2024-12-19 ASSESSMENT — PAIN DESCRIPTION - DESCRIPTORS: DESCRIPTORS: DISCOMFORT

## 2024-12-19 ASSESSMENT — PAIN - FUNCTIONAL ASSESSMENT: PAIN_FUNCTIONAL_ASSESSMENT: 0-10

## 2024-12-19 ASSESSMENT — PAIN SCALES - GENERAL: PAINLEVEL_OUTOF10: 4

## 2024-12-19 NOTE — PROGRESS NOTES
Physical Therapy    Physical Therapy Treatment    Patient Name: Edis Vogt  MRN: [id number]  Today's Date: 2024    Time Entry:   Time Calculation  Start Time: 1210  Stop Time: 1305  Time Calculation (min): 55 min     PT Therapeutic Procedures Time Entry  Manual Therapy Time Entry: 5  Therapeutic Exercise Time Entry: 48  Self-Care/Home Mgmt Trainin                    Insurance:  Visit: #3  Authorized: MEDICARE A&B MED % COVERAGE, AARP SUPP. MED NEC NO AUTH ($265.72 USED PT/ST)   Payor/Plan: Payor: MEDICARE / Plan: MEDICARE PART A AND B / Product Type: *No Product type* /     Current Problem  1. Muscle spasm        2. Muscle weakness        3. Urinary urgency        4. Urge incontinence of urine        5. OAB (overactive bladder)              Assessment   Patient demonstrates decreased strength and flexibility of bilateral lower extremities. Tolerated exercises well with some pain and stiffness during stretches. Patient will continue to benefit from skilled physical therapy to improve function and meet therapy goals.    Plan  Next Visit Plan: add more stretches and strengthening as able, biofeedback?    Goals:  Patient will demonstrate independence and report adherence with home exercise program (HEP) to facilitate independent rehab program upon discharge to maximize functional gains.  NIH CPSI will improve by at least 6 points. Baseline: 15  Patient to report 50% improvement in urge incontinence to demonstrate improved pelvic floor strength and bladder control.  Patient to practice diaphragmatic breathing and pelvic floor relaxation independently for improved range of motion and bladder control.  Patient to increase 30 second sit to stand test to demonstrate improved strength of bilateral lower extremities and decreased fall risk. Baseline: 7  Patient to report 50% improvement in controlling urge for improved confidence in community and social settings.    Subjective   Last Friday night, had  accident (diarrhea at night). Monday, had diarrhea attack while out to lunch and had fecal incontinence as well.    Has cut out eggs but still diarrhea every day.    Has been up less at night to urinate (1x). Cut back on water at end of day, but less water in general now too.    Urinated in sleep 1x.    Exercises 3-4x a day.    Pain:  Pain Assessment: 0-10  0-10 (Numeric) Pain Score: 4  Pain Location: Rectum  Pain Descriptors: Discomfort (Constipated)           Objective   Additional Verbalized Informed Consent Explained by Treating Therapist: Yes  Patient Understanding of Examination Techniques:  The patient has been informed about the various examination techniques to be utilized today, including both external and internal methods. A detailed explanation of the purpose and benefits of these techniques has been provided, ensuring the patient has a clear understanding of each aspect.    Consent and Autonomy:  The patient understands that consent for the examination is an ongoing process. They have the right to withdraw consent and terminate the examination at any point should they feel uncomfortable or wish to discontinue for any reason.  The patient acknowledges the importance of communication during the examination and agrees to promptly inform the examiner if they experience any discomfort at any time. This will allow for immediate adjustments to be made to ensure their comfort and safety.    Alternatives to Internal Examination:  The patient is aware of alternative options to internal examinations, includin. Education and Instruction: Providing information and guidance without performing an internal exam.  2. External Examination: Conducting a pelvic exam while the patient remains clothed or partially clothed.  3. No Examination: The option to forgo the examination entirely if the patient chooses.  The patient has confirmed their understanding of these alternatives and their  implications.    Conclusion:  The patient’s consent is based on a thorough understanding of the risks, benefits, and purposes of the examination techniques discussed today. They have been encouraged to ask questions and express any concerns they may have, ensuring an informed and collaborative approach to their care.    No internal work today      Flexibility:  Hamstrings: R -20, L -30  Hip flexors: R Major limitation, L major limitation  Hip external rotation: R mod limitation, L mod limitation  Hip internal rotation: R major limitation, L major limitation  Quadriceps: R mod limitation, L major limitation  Adductors: R mod limitation, L mod limitation    Strength:   Hip flexion: R 4+/5, L 4+/5  Hip internal rotation: R 5/5, L 5/5  Hip external rotation: R 4+/5, L 4+/5  Hip abduction: R 3+/5, L 3+/5  Hip extension: R 3-/5, L 3-/5    Special tests:  SLR: R positive  Femoral tension: L positive        TREATMENT    Therapeutic exercise:  Time for objective measurements  *Bilateral hamstring stretch with strap 3x30 seconds  Instructed proper glute activation  *Bridge 3x10  *Modified gracie stretch bilateral x30 seconds  Diaphragmatic breathing with pelvic floor lengthening    Manual therapy:  Colon massage, pt in supine with knees supported  *Instruction on how to perform colon massage on self at home - handout provided      Self-care/home management:  Education provided on proper elimination position, squatty potty, and toileting mechanics for improved muscle relaxation and decreased straining with bowel movements and bladder emptying      Education:  Home exercise program: Reviewed and educated patient on additions/changes for home exercise program as above (*)     Laura Beckett, PT, DPT

## 2024-12-26 ENCOUNTER — TREATMENT (OUTPATIENT)
Dept: PHYSICAL THERAPY | Facility: CLINIC | Age: 79
End: 2024-12-26
Payer: MEDICARE

## 2024-12-26 DIAGNOSIS — N39.41 URGE INCONTINENCE OF URINE: ICD-10-CM

## 2024-12-26 DIAGNOSIS — R39.15 URINARY URGENCY: ICD-10-CM

## 2024-12-26 DIAGNOSIS — M62.838 MUSCLE SPASM: Primary | ICD-10-CM

## 2024-12-26 DIAGNOSIS — M62.81 MUSCLE WEAKNESS: ICD-10-CM

## 2024-12-26 PROCEDURE — 97535 SELF CARE MNGMENT TRAINING: CPT | Mod: GP

## 2024-12-26 PROCEDURE — 97110 THERAPEUTIC EXERCISES: CPT | Mod: GP

## 2024-12-26 ASSESSMENT — PAIN SCALES - GENERAL: PAINLEVEL_OUTOF10: 5 - MODERATE PAIN

## 2024-12-26 ASSESSMENT — PAIN - FUNCTIONAL ASSESSMENT: PAIN_FUNCTIONAL_ASSESSMENT: 0-10

## 2024-12-26 NOTE — PROGRESS NOTES
Physical Therapy    Physical Therapy Treatment    Patient Name: Edis Vogt  MRN: [id number]  Today's Date: 2024    Time Entry:   Time Calculation  Start Time: 1305  Stop Time: 1358  Time Calculation (min): 53 min     PT Therapeutic Procedures Time Entry  Therapeutic Exercise Time Entry: 15  Self-Care/Home Mgmt Trainin                    Insurance:  Visit: #4  Authorized: MEDICARE A&B MED % COVERAGE, AARP SUPP. MED NEC NO AUTH ($265.72 USED PT/ST)   Payor/Plan: Payor: MEDICARE / Plan: MEDICARE PART A AND B / Product Type: *No Product type* /     Current Problem  1. Muscle spasm        2. Muscle weakness        3. Urinary urgency        4. Urge incontinence of urine                Assessment   Patient demonstrates improved symptoms overall but with intermittent flare-ups. Patient to work on home exercise program independently and track symptoms and return in a month for check in to decide how to proceed with physical therapy. Patient will continue to benefit from skilled physical therapy to improve function and meet therapy goals.    Plan  Next Visit Plan: discharge?    Goals:  Updated 2024  Patient will demonstrate independence and report adherence with home exercise program (HEP) to facilitate independent rehab program upon discharge to maximize functional gains. - MET  NIH CPSI will improve by at least 6 points. Baseline: 15 - NT  Patient to report 50% improvement in urge incontinence to demonstrate improved pelvic floor strength and bladder control. - MET (90%)  Patient to practice diaphragmatic breathing and pelvic floor relaxation independently for improved range of motion and bladder control. - MET  Patient to increase 30 second sit to stand test to demonstrate improved strength of bilateral lower extremities and decreased fall risk. Baseline: 7 - NT  Patient to report 50% improvement in controlling urge for improved confidence in community and social settings. - MET  "(90%)    Subjective   Symptoms have been good until this morning. Had one good bowel movement but then a few times a little loose. Not diarrhea yet. But ate \"trash\" yesterday. Has tried using stool under feet but doesn't feel like it helps and makes it hurt more. Performing colon massage at home    With urination, leaks every once in while. Stream is very weak, has been that way for 4-5 days. No irritation    Can't do hip flexor stretch on left side.    Nocturia still 3x at night so improved but not great    Pain:  Pain Assessment: 0-10  0-10 (Numeric) Pain Score: 5 - Moderate pain  Pain Location: Hip  Pain Orientation: Anterior           Objective   Additional Verbalized Informed Consent Explained by Treating Therapist: Yes  Patient Understanding of Examination Techniques:  The patient has been informed about the various examination techniques to be utilized today, including both external and internal methods. A detailed explanation of the purpose and benefits of these techniques has been provided, ensuring the patient has a clear understanding of each aspect.    Consent and Autonomy:  The patient understands that consent for the examination is an ongoing process. They have the right to withdraw consent and terminate the examination at any point should they feel uncomfortable or wish to discontinue for any reason.  The patient acknowledges the importance of communication during the examination and agrees to promptly inform the examiner if they experience any discomfort at any time. This will allow for immediate adjustments to be made to ensure their comfort and safety.    Alternatives to Internal Examination:  The patient is aware of alternative options to internal examinations, includin. Education and Instruction: Providing information and guidance without performing an internal exam.  2. External Examination: Conducting a pelvic exam while the patient remains clothed or partially clothed.  3. No Examination: " The option to forgo the examination entirely if the patient chooses.  The patient has confirmed their understanding of these alternatives and their implications.    Conclusion:  The patient’s consent is based on a thorough understanding of the risks, benefits, and purposes of the examination techniques discussed today. They have been encouraged to ask questions and express any concerns they may have, ensuring an informed and collaborative approach to their care.    No internal work today      Gait: Antalgic        TREATMENT    Therapeutic exercise:  *Psoas march with OTB in supine 3x10 bilateral   *Standing hip flexor stretch bilateral x30s - does not feel in hip flexor, just calves      Self-care/home management:  Discussed plan of care, progression towards goals, reviewed education (urge suppression, squatty potty, fluid intake, diet, relaxing muscles)      Education:  Home exercise program: Reviewed and educated patient on additions/changes for home exercise program as above (*)     Laura Beckett, PT, DPT

## 2025-01-06 ENCOUNTER — TELEPHONE (OUTPATIENT)
Dept: PRIMARY CARE | Facility: CLINIC | Age: 80
End: 2025-01-06

## 2025-01-06 DIAGNOSIS — N32.81 OAB (OVERACTIVE BLADDER): ICD-10-CM

## 2025-01-06 DIAGNOSIS — I48.0 PAROXYSMAL ATRIAL FIBRILLATION (MULTI): ICD-10-CM

## 2025-01-06 RX ORDER — TROSPIUM CHLORIDE 20 MG/1
20 TABLET, FILM COATED ORAL NIGHTLY
Qty: 90 TABLET | Refills: 3 | Status: SHIPPED | OUTPATIENT
Start: 2025-01-06

## 2025-01-06 NOTE — TELEPHONE ENCOUNTER
Pt lvm requesting refill of eliquis, he said samples were provided at [facility] but would like new rx sent to [facility].     Lov 12/9/24  Pov 5/16/25

## 2025-01-07 DIAGNOSIS — I48.0 PAROXYSMAL ATRIAL FIBRILLATION (MULTI): Primary | ICD-10-CM

## 2025-01-07 DIAGNOSIS — I48.0 PAROXYSMAL ATRIAL FIBRILLATION (MULTI): ICD-10-CM

## 2025-01-28 ENCOUNTER — APPOINTMENT (OUTPATIENT)
Dept: UROLOGY | Facility: CLINIC | Age: 80
End: 2025-01-28
Payer: MEDICARE

## 2025-01-28 VITALS — SYSTOLIC BLOOD PRESSURE: 123 MMHG | HEART RATE: 76 BPM | DIASTOLIC BLOOD PRESSURE: 70 MMHG

## 2025-01-28 DIAGNOSIS — N13.8 BPH WITH OBSTRUCTION/LOWER URINARY TRACT SYMPTOMS: ICD-10-CM

## 2025-01-28 DIAGNOSIS — R35.0 URINARY FREQUENCY: Primary | ICD-10-CM

## 2025-01-28 DIAGNOSIS — N40.1 BPH WITH OBSTRUCTION/LOWER URINARY TRACT SYMPTOMS: ICD-10-CM

## 2025-01-28 DIAGNOSIS — R33.9 URINARY RETENTION: ICD-10-CM

## 2025-01-28 DIAGNOSIS — N39.41 URGE INCONTINENCE OF URINE: ICD-10-CM

## 2025-01-28 DIAGNOSIS — R19.7 DIARRHEA, UNSPECIFIED TYPE: ICD-10-CM

## 2025-01-28 PROCEDURE — G2211 COMPLEX E/M VISIT ADD ON: HCPCS | Performed by: NURSE PRACTITIONER

## 2025-01-28 PROCEDURE — 1157F ADVNC CARE PLAN IN RCRD: CPT | Performed by: NURSE PRACTITIONER

## 2025-01-28 PROCEDURE — 99214 OFFICE O/P EST MOD 30 MIN: CPT | Performed by: NURSE PRACTITIONER

## 2025-01-28 PROCEDURE — 3074F SYST BP LT 130 MM HG: CPT | Performed by: NURSE PRACTITIONER

## 2025-01-28 PROCEDURE — 1159F MED LIST DOCD IN RCRD: CPT | Performed by: NURSE PRACTITIONER

## 2025-01-28 PROCEDURE — 3078F DIAST BP <80 MM HG: CPT | Performed by: NURSE PRACTITIONER

## 2025-01-28 RX ORDER — TRAZODONE HYDROCHLORIDE 50 MG/1
50 TABLET ORAL NIGHTLY
COMMUNITY

## 2025-01-28 NOTE — PROGRESS NOTES
Subjective   Patient ID: Edis Vogt is a 80 y.o. male who presents for No chief complaint on file..  Patient presents to determine plan of care related to incomplete bladder emptying. Previously seen Suresh Nagel CNP. PMH: possible dementia and afib, questionable history of TIAs.     Reports urinary frequency x years. Also with significant urgency, double voiding. NTF 1-2, sometimes wakes due to incontinence. Denies daytime leakage. Wearing 1 depend/day. Denies gross hematuria. Significant urgency, not always able to go when he has to.     Trouble with sleeping. Tried sleeping med which caused him to sleep through urination. He has stopped taking as a result. Urinating 1-2 times per night, waking up an additional 1-2 times. Previously taking mirtazapine, switched to trazadone last week, has not started yet.     PSA 1.94 in fall 2024.     Drinking 40 oz water/day with Liquid IV. 12-16 oz coke daily. Stops drinking 2 hours before bed.     Remains on tamsulosin and added trospium in December. States stream quality varies. Double voiding to feel that he empties. He did try PFPT which was not beneficial. He was compliant for 1 month.                Review of Systems   All other systems reviewed and are negative.      Objective   Physical Exam  Vitals reviewed.     Alert and oriented x3  Moist mucous membranes  Breathes easily on room air  Abdomen soft, nondistended  No edema  No scleral icterus  No focal neurological deficits  Appears stated age, no acute distress    Lab Results   Component Value Date    PSA 1.94 10/23/2024     PVR= 315ml      Assessment/Plan   Diagnoses and all orders for this visit:  Urinary frequency  -     Post-Void Residual  BPH with obstruction/lower urinary tract symptoms  -     Urodynamic studies; Future  -     Urinalysis with Reflex Culture and Microscopic  -     Cystourethroscopy; Future  Diarrhea, unspecified type  -     Referral to Gastroenterology; Future  Urinary retention  -      Urodynamic studies; Future  -     Urinalysis with Reflex Culture and Microscopic  -     Cystourethroscopy; Future  Urge incontinence of urine  -     Urodynamic studies; Future    Stop trospium, continue tamsulosin. Discussed further eval with UDS as I suspect he has neurologic component and potentially K[place]IA. Will arrange for cysto afterwards to determine plan of care. Patient and his daughter are agreeable.          CHUNG Jaimes-CNP 01/28/25 9:06 AM

## 2025-01-28 NOTE — PATIENT INSTRUCTIONS
URODYNAMIC STUDIES (UDS)  PURPOSE:  TESTING WILL TAKE 60-90 MINUTES.  IT IS TO EVALUATE THE FUNCTION OF YOUR BLADDER AND URETHRA (TUBE THROUGH WHICH YOUR URINE EXITS THE BODY).  YOUR URODYNAMIC STUDY WILL HELP YOUR PHYSICIAN EVALUATE HOW WELL THE URNIARY OUTFLOW SYSTEM DOES ITS JOB OF STORING, TRANSPORTING AND EXPELLING URINE.  THIS TEST WILL ALLOW US TO COME UP WITH A TREATMENT PLAN CUSTOMIZED TO HELP YOU.  THREE TESTS MAKE UP THE URODYNAMIC STUDY  CYSTOMETROGRAM  SPHINCTER ELECTROMYOGRAPHY  INTRA-ABDOMINAL PRESSURE    PREPARATION  IF YOU TAKE ANTIBIOTICS PRIOR TO A DENTAL APPOINTMENT, PLEASE NOTIFY YOUR DOCTOR AND THE URODYNAMIC TECH.  TRY TO HAVE A BOWEL MOVEMENT THE MORNING OF THE STUDY.  STOP YOUR URINARY MEDICATIONS 48 HOURS PRIOR TO THE STUDY AS INSTRUCTED BY YOUR PROVIDER.  STEPS   ARRIVE FOR YOUR TEST WITH A COMFORTABLY FULL BALDDER AND YOU WILL BE ASKED TO URINATE INTO THE UROFLOW MACHINE.  THREE SURFACE ELECTRODES (STICKY PADS) WILL BE USED, ONE ON THE KNEE, AND TWO PLACED BY THE RECTAL OPENING.  A SMALL TUBE WILL BE PLACED IN YOUR BLADDER AND ONE IN YOUR RECTUM FOR THE UDS TESTING.  YOU WILL BE ASKED TO TELL THE TECHNICIAN SENSATIONS YOU FEEL AS YOUR BLADDER IS FILLING.  YOU WILL BE ASKED TO EMPTY YOUR BLADDER WHEN IT IS FULL AND THEN THE TEST WILL BE OVER.    FAQs  CAN I DRINK OR EAT THE DAY OF THE STUDY? YES, THERE ARE NO RESTRICTIONS.  WILL I BE ABLE TO RETURN TO NORMAL ACTIVITIES IMMEDIATELY AFTER THE TEST?  YES, HOWEVER YOU MAY FEEL A SLIGHT BURNING SENSATION WITH URINATION AND YOU ARE ENCOURAGED TO INCREASE YOUR FLUD INTAKE FOR 24 HOURS.    WHAT TO EXPECT AFTER YOUR URODYNAMIC TESTING  AFTER THE URODYNAMIC TESTING, SOME PEOPLE FEEL A SLIGHT STINGING OR BURNING SENSATION WHEN THEY URINATE.  IT IS NOT UNUSUAL TO FIND A SMALL AMOUNT OF BLOOD IN YOUR URINE OR RECTAL SPOTTING WHEN YOU USE THE RESTROOM.  IF YOU DRINK PLENTY OF FLUIDS THESE SYMPTOMS WILL USUALLY QUICKLY CLEAR UP.  DRINKING EXTRA FLUIDS  WILL HELP TO FLUSH YOUR SYSTEM.  WE RECOMMEND YOU CUT BACK ON CAFFEINATED BEVERAGES AND AVOID ALCOHOL FOR 24 HOURS AFTER THE TESTING.  THIS WILL HELP LESSEN ANY BLADDER IRRITATION UNTIL YOUR BLADDER FUNCTION RETURNS TO NORMAL.  MOST PEOPLE HAVE URODYNAMIC TESTING WITHOUT EXPERIENCING ANY DISCOMFORT OR PROBLEMS AT ALL.  HOWEVER, THERE IS A SMAL CHANCE THAT YOU COULD DEVELOP A URINARY TRACT INFECTION.    PLEASE CONTACT YOUR REFERRING PHYSICIAN IN YOU DEVELOP ANY OF THE FOLLOWING SYMPTOMS  YOUR URINE SMELLS, IS COULDY OR HAS MORE THAN A SMALL AMOUNT OF BLOOD IN IT.  A STRONGER THAN USUAL URGE TO PASS URINE.  PAIN WITH URINATION THAT LASTS BEYOND 48 HOURS.  LOWER BACK ACHE OR PAIN IN YOUR KIDNEY AREA  IF YOU DEVELOP A HIGH TEMPERATURE.     TO CONTACT THE [facility], CALL [phone] OR [email or web address].

## 2025-01-29 ENCOUNTER — APPOINTMENT (OUTPATIENT)
Dept: PHYSICAL THERAPY | Facility: CLINIC | Age: 80
End: 2025-01-29
Payer: MEDICARE

## 2025-02-04 ENCOUNTER — APPOINTMENT (OUTPATIENT)
Dept: UROLOGY | Facility: CLINIC | Age: 80
End: 2025-02-04
Payer: MEDICARE

## 2025-02-04 DIAGNOSIS — N13.8 BPH WITH OBSTRUCTION/LOWER URINARY TRACT SYMPTOMS: ICD-10-CM

## 2025-02-04 DIAGNOSIS — N40.1 BPH WITH OBSTRUCTION/LOWER URINARY TRACT SYMPTOMS: ICD-10-CM

## 2025-02-04 DIAGNOSIS — R33.9 URINARY RETENTION: ICD-10-CM

## 2025-02-04 DIAGNOSIS — N39.41 URGE INCONTINENCE OF URINE: ICD-10-CM

## 2025-02-04 PROCEDURE — 51797 INTRAABDOMINAL PRESSURE TEST: CPT | Performed by: UROLOGY

## 2025-02-04 PROCEDURE — 51784 ANAL/URINARY MUSCLE STUDY: CPT | Performed by: UROLOGY

## 2025-02-04 PROCEDURE — 51741 ELECTRO-UROFLOWMETRY FIRST: CPT | Performed by: UROLOGY

## 2025-02-04 PROCEDURE — 51728 CYSTOMETROGRAM W/VP: CPT | Performed by: UROLOGY

## 2025-02-04 NOTE — PROGRESS NOTES
Edis Vogt 80 y.o. male  Procedures:  Patient referred by Lily Felder CNP for urodynamics to evaluate urge incontinence, urinary retention and BPH w/LUTS. Dr. Garcia was present in office at time of study. Urine was collected via straight cath which clear for uti today. Patient cath for 100 ml. Last void was approximately 3 hrs prior to study.  Patient did not leak on CLPP. Patient did have DO w/w/o leak during study. Decreased infusion rate to 10 ml/min. Total infusion was 143 ml. Pvr after study was 0 ml.  Patient instructed to increase fluids if he has any burning or blood in urine. Patient made aware he may have blood rectally due to abdominal catheter insertion.  Patient understood and consented to urodynamics. Patient will follow up with Dr. Antunez for cystoscopy to review results. 02/04/2025/jazmine

## 2025-02-28 RX ORDER — LIDOCAINE HYDROCHLORIDE 20 MG/ML
1 JELLY TOPICAL ONCE
Status: COMPLETED | OUTPATIENT
Start: 2025-03-03 | End: 2025-03-03

## 2025-03-03 ENCOUNTER — APPOINTMENT (OUTPATIENT)
Dept: UROLOGY | Facility: CLINIC | Age: 80
End: 2025-03-03
Payer: MEDICARE

## 2025-03-03 VITALS — TEMPERATURE: 97.5 F | SYSTOLIC BLOOD PRESSURE: 132 MMHG | HEART RATE: 37 BPM | DIASTOLIC BLOOD PRESSURE: 62 MMHG

## 2025-03-03 DIAGNOSIS — N13.8 BPH WITH OBSTRUCTION/LOWER URINARY TRACT SYMPTOMS: ICD-10-CM

## 2025-03-03 DIAGNOSIS — R33.9 URINARY RETENTION: Primary | ICD-10-CM

## 2025-03-03 DIAGNOSIS — N40.1 BPH WITH OBSTRUCTION/LOWER URINARY TRACT SYMPTOMS: ICD-10-CM

## 2025-03-03 PROCEDURE — 99214 OFFICE O/P EST MOD 30 MIN: CPT | Performed by: UROLOGY

## 2025-03-03 PROCEDURE — 52000 CYSTOURETHROSCOPY: CPT | Performed by: UROLOGY

## 2025-03-03 RX ORDER — MOXIFLOXACIN 5 MG/ML
1 SOLUTION/ DROPS OPHTHALMIC
COMMUNITY
Start: 2025-01-30

## 2025-03-03 RX ORDER — PREDNISOLONE ACETATE 10 MG/ML
SUSPENSION/ DROPS OPHTHALMIC
COMMUNITY
Start: 2025-01-30

## 2025-03-03 RX ADMIN — LIDOCAINE HYDROCHLORIDE 1 APPLICATION: 20 JELLY TOPICAL at 14:59

## 2025-03-03 ASSESSMENT — PATIENT HEALTH QUESTIONNAIRE - PHQ9
SUM OF ALL RESPONSES TO PHQ9 QUESTIONS 1 AND 2: 0
1. LITTLE INTEREST OR PLEASURE IN DOING THINGS: NOT AT ALL
2. FEELING DOWN, DEPRESSED OR HOPELESS: NOT AT ALL

## 2025-03-03 ASSESSMENT — ENCOUNTER SYMPTOMS
LOSS OF SENSATION IN FEET: 0
DEPRESSION: 0
OCCASIONAL FEELINGS OF UNSTEADINESS: 0

## 2025-03-03 NOTE — PROGRESS NOTES
CHIEF COMPLAINT:  Presents to the office today to discuss:  1. Urinary frequency and urgency  2. Incomplete bladder emptying  3. Urinary retention  4. Urge incontinence    HPI TODAY: 03/03/2025:  Sin presents with longstanding urinary symptoms including frequency, urgency, and incomplete emptying. Urodynamics on 02/04/2025 demonstrated significant detrusor pressure during voiding of 112 cm H2O with a voided volume of 74 mL and peak flow rate of 7 mL/sec. Significant phasic detrusor overactivity with leakage was noted, and bladder capacity was approximately 180 cc's. Previous post-void residual was 315 mL.    Urinary Symptoms:  - Reports urinary frequency for several years with significant urgency and double voiding  - Nocturia 1-2 times per night, sometimes with incontinence  - No daytime leakage reported  - Using 1 depend/day  - No gross hematuria  - Stream quality varies  - Double voids to feel empty  - Previously attempted pelvic floor physical therapy for one month without benefit  - Currently on tamsulosin and trospium without significant improvement  - Drinking 40 oz water/day with Liquid IV, 12-16 oz coke daily  - Stops drinking 2 hours before bed    PSA History:  - 10/23/2024: 1.94    PAST MEDICAL HISTORY:  - Atrial fibrillation  - Depression  - Diabetic peripheral neuropathy  - Lumbosacral spondylolisthesis  - Spinal stenosis  - Possible dementia  - Questionable history of TIAs    SOCIAL:  - 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    PHYSICAL EXAMINATION:  Gen: NAD  Pulm: No increased WOB on RA  Cards: WWP  : Urethra normal. Prostate modestly enlarged. Bladder shows signs of chronic obstruction with trabeculation and outpouching.    Patient ID: Edis Vogt is a 80 y.o. male.    Cystoscopy    Date/Time: 3/3/2025 5:07 PM    Performed by: Long Antunez  MD  Authorized by: Long Antunez MD      Comments:      The R/B/A to the following procedure were discussed and an informed consent was signed. A time-out was performed confirming the correct procedure, laterality (if applicable), and plan.    The patient was prepped and draped in the standard sterile fashion. A 16 Bulgarian flexible cystoscope was inserted through the penis. The following finding were noted:    Anterior urethra -normal  Prostate -modestly enlarged with elevated median bar  Bladder mucosa-3+ trabeculation, no tumors or other stones present  Ureteral orifices -visualized in orthotopic position    The patient tolerated the procedure well.            ASSESSMENT/PLAN:    Benign Prostatic Hyperplasia with Lower Urinary Tract Symptoms (N40.1)  - Assessment: Moderate prostate enlargement with evidence of bladder outlet obstruction  - Plan: Recommended GreenLight laser prostatectomy with intraoperative Botox injection to bladder  - Counseling: Risks, benefits, and alternatives were discussed including but not limited to bleeding, infection, urinary retention, retrograde ejaculation, erectile dysfunction, and urinary incontinence. 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 Daughter to attend next visit for shared decision-making    The patient provided verbal consent for the audio recording of today's encounter using AI.

## 2025-04-18 ENCOUNTER — APPOINTMENT (OUTPATIENT)
Dept: NEUROLOGY | Facility: HOSPITAL | Age: 80
End: 2025-04-18
Payer: MEDICARE

## 2025-04-28 ENCOUNTER — APPOINTMENT (OUTPATIENT)
Dept: GASTROENTEROLOGY | Facility: CLINIC | Age: 80
End: 2025-04-28
Payer: MEDICARE

## 2025-05-16 ENCOUNTER — APPOINTMENT (OUTPATIENT)
Dept: CARDIOLOGY | Facility: CLINIC | Age: 80
End: 2025-05-16
Payer: MEDICARE

## 2025-05-30 ENCOUNTER — APPOINTMENT (OUTPATIENT)
Dept: CARDIOLOGY | Facility: CLINIC | Age: 80
End: 2025-05-30
Payer: MEDICARE

## 2025-05-30 VITALS
DIASTOLIC BLOOD PRESSURE: 60 MMHG | WEIGHT: 177 LBS | HEIGHT: 69 IN | SYSTOLIC BLOOD PRESSURE: 128 MMHG | BODY MASS INDEX: 26.22 KG/M2 | HEART RATE: 79 BPM

## 2025-05-30 DIAGNOSIS — R44.8 FEELS COLD: Primary | ICD-10-CM

## 2025-05-30 DIAGNOSIS — Z79.01 CHRONIC ANTICOAGULATION: ICD-10-CM

## 2025-05-30 DIAGNOSIS — I48.0 PAROXYSMAL ATRIAL FIBRILLATION (MULTI): ICD-10-CM

## 2025-05-30 DIAGNOSIS — I25.10 ATHEROSCLEROSIS OF NATIVE CORONARY ARTERY OF NATIVE HEART WITHOUT ANGINA PECTORIS: ICD-10-CM

## 2025-05-30 DIAGNOSIS — N18.31 STAGE 3A CHRONIC KIDNEY DISEASE (MULTI): ICD-10-CM

## 2025-05-30 DIAGNOSIS — I10 ESSENTIAL HYPERTENSION: ICD-10-CM

## 2025-05-30 DIAGNOSIS — E78.2 MIXED HYPERLIPIDEMIA: ICD-10-CM

## 2025-05-30 PROCEDURE — 1159F MED LIST DOCD IN RCRD: CPT | Performed by: INTERNAL MEDICINE

## 2025-05-30 PROCEDURE — 1160F RVW MEDS BY RX/DR IN RCRD: CPT | Performed by: INTERNAL MEDICINE

## 2025-05-30 PROCEDURE — 3078F DIAST BP <80 MM HG: CPT | Performed by: INTERNAL MEDICINE

## 2025-05-30 PROCEDURE — 99214 OFFICE O/P EST MOD 30 MIN: CPT | Performed by: INTERNAL MEDICINE

## 2025-05-30 PROCEDURE — G2211 COMPLEX E/M VISIT ADD ON: HCPCS | Performed by: INTERNAL MEDICINE

## 2025-05-30 PROCEDURE — 3074F SYST BP LT 130 MM HG: CPT | Performed by: INTERNAL MEDICINE

## 2025-05-30 ASSESSMENT — ENCOUNTER SYMPTOMS
SLEEP DISTURBANCE: 1
HEMATURIA: 0
LIGHT-HEADEDNESS: 0
ACTIVITY CHANGE: 1
FREQUENCY: 1
GASTROINTESTINAL NEGATIVE: 1
DIZZINESS: 0
CARDIOVASCULAR NEGATIVE: 1
SHORTNESS OF BREATH: 0
FATIGUE: 1
DIFFICULTY URINATING: 1

## 2025-05-30 NOTE — PROGRESS NOTES
"  Patient:  Edis Vogt  YOB: 1945  MRN: [id number]       Chief Complaint/Active Symptoms:       Edis Vogt is a 80 y.o. male who returns today for cardiac follow-up of paroxysmal atrial fibrillation and chronic anticoagulation.    Patient states he is not doing well. Is having neurology and urology problems. Was having \"brain fog\" that is now resolving. Is getting up to urinate hourly and not getting any sleep, is seeing urology next week.     Is complaining of feeling cold since starting eliquis. Family is complaining about the heat in the house since this medications.     Review of Systems   Constitutional:  Positive for activity change and fatigue.   HENT:  Positive for hearing loss.    Respiratory:  Negative for shortness of breath.    Cardiovascular: Negative.    Gastrointestinal: Negative.    Endocrine: Positive for cold intolerance.   Genitourinary:  Positive for decreased urine volume, difficulty urinating and frequency. Negative for hematuria.   Neurological:  Negative for dizziness and light-headedness.   Psychiatric/Behavioral:  Positive for sleep disturbance.    All other systems reviewed and are negative.      Objective:     Vitals:    05/30/25 1126   BP: 128/60   Pulse: 79       Vitals:    05/30/25 1126   Weight: 80.3 kg (177 lb)       Allergies:     Allergies[1]       Medications:     Current Outpatient Medications   Medication Instructions    apixaban (ELIQUIS) 5 mg, oral, 2 times daily    atorvastatin (LIPITOR) 80 mg, Nightly    cholestyramine (Questran) 4 gram packet 1 packet, As needed    donepezil (ARICEPT) 5 mg, Nightly    famotidine (PEPCID) 40 mg, Daily    gabapentin (NEURONTIN) 1,200 mg, 2 times daily    losartan (COZAAR) 50 mg, oral, Daily    magnesium oxide (MAG-OX) 400 mg, Daily    metFORMIN (GLUCOPHAGE) 1,000 mg, 2 times daily (morning and late afternoon)    metoprolol tartrate (LOPRESSOR) 25 mg, oral, 2 times daily    moxifloxacin (Vigamox) 0.5 % ophthalmic solution 1 " drop    nitroglycerin (NITROSTAT) 0.4 mg, Every 5 min PRN    prednisoLONE acetate (Pred-Forte) 1 % ophthalmic suspension     psyllium (Metamucil) 3.4 gram packet 1 packet, Daily PRN    tamsulosin (FLOMAX) 0.4 mg, Daily    traZODone (DESYREL) 50 mg, Nightly    trospium (SANCTURA) 20 mg, oral, Nightly       Physical Examination:   GENERAL:  Well developed, well nourished, in no acute distress.  HEENT: NC AT, anicteric sclera  NECK:   no JVD, questionable referred cardiac murmur on right  CHEST:  Symmetric and nontender.  LUNGS:  Clear to auscultation bilaterally, normal respiratory effort.  HEART:  PMI is nondisplaced. RRR with normal S1 and S2, no S3, ejection murmur at the right upper sternal border ABDOMEN: Soft, NT, ND without palpable organomegaly or bruits  EXTREMITIES:  Warm with good color, no clubbing or cyanosis.  There is no edema noted.  PERIPHERAL VASCULAR:  Pulses present and equally palpable.  MUSCULOSKELETAL: Osteoarthritic changes  NEURO/PSYCH:  Alert and oriented times three with approppriate behavior and responses. Nonfocal motor examination with normal gait and ambulation    Lab:     CBC:   Lab Results   Component Value Date    WBC 5.2 10/24/2024    RBC 3.77 (L) 10/24/2024    HGB 11.4 (L) 10/24/2024    HCT 34.2 (L) 10/24/2024     10/24/2024        CMP:    Lab Results   Component Value Date     10/24/2024    K 3.8 10/24/2024     10/24/2024    CO2 25 10/24/2024    BUN 13 10/24/2024    CREATININE 1.32 (H) 10/24/2024    GLUCOSE 123 (H) 10/24/2024    CALCIUM 8.9 10/24/2024       Magnesium:    Lab Results   Component Value Date    MG 1.43 (L) 10/24/2024       Lipid Profile:    Lab Results   Component Value Date    TRIG 100 10/23/2024    HDL 29.4 10/23/2024    LDLCALC 60 10/23/2024       TSH:    Lab Results   Component Value Date    TSH 1.84 10/23/2024       BNP:   Lab Results   Component Value Date     (H) 10/23/2024        PT/INR:    Lab Results   Component Value Date     PROTIME 13.8 (H) 06/07/2022    INR 1.2 (H) 06/07/2022       HgBA1c:    Lab Results   Component Value Date    HGBA1C 6.9 (H) 10/23/2024       BMP:  Lab Results   Component Value Date     10/24/2024     10/23/2024     06/08/2022     06/07/2022     01/11/2022    K 3.8 10/24/2024    K 3.9 10/23/2024    K 3.7 06/08/2022    K 4.3 06/07/2022    K 4.9 01/11/2022     10/24/2024     10/23/2024     06/08/2022     06/07/2022     01/11/2022    CO2 25 10/24/2024    CO2 23 10/23/2024    CO2 22 06/08/2022    CO2 26 06/07/2022    CO2 31 01/11/2022    BUN 13 10/24/2024    BUN 14 10/23/2024    BUN 17 06/08/2022    BUN 22 06/07/2022    BUN 21 01/11/2022    CREATININE 1.32 (H) 10/24/2024    CREATININE 1.26 10/23/2024    CREATININE 1.37 (H) 06/08/2022    CREATININE 1.63 (H) 06/07/2022    CREATININE 1.59 (H) 01/11/2022       Cardiac Enzymes:    Lab Results   Component Value Date    TROPHS 6 10/23/2024    TROPHS 5 10/23/2024    TROPHS 7 06/07/2022       Hepatic Function Panel:    Lab Results   Component Value Date    ALKPHOS 78 10/23/2024    ALT 17 10/23/2024    AST 18 10/23/2024    PROT 6.3 (L) 10/23/2024    BILITOT 0.8 10/23/2024     Labs reviewed, more recent labs from [facility] shows a hemoglobin of 12 hematocrit 36.6 iron studies shows a decreased iron binding capacity but normal saturation CMP in December creatinine of 1.54 panel was from June in 2024 DL cholesterol was 27 HDL cholesterol 37.    Diagnostic Studies:     Transthoracic Echo (TTE) Complete  Result Date: 10/24/2024            [facility] [id number] [facility], [facility] [address]    Tel [phone] Fax [phone] TRANSTHORACIC ECHOCARDIOGRAM REPORT Patient Name:      CAITLIN Addison Physician:    [id number] Olegario Howard MD Study Date:        10/24/2024          Ordering Provider:    [id number] SUSANA BEVERLY                                                               JOAO MRN/PID:           [id number]            Fellow: Accession#:        [id number]        Nurse:                Sonia Asher Date of Birth/Age: 1945 / 79 years Sonographer:          Hayley Giron RDCS Gender:            M                   Additional Staff: Height:            175.26 cm           Admit Date: Weight:            74.84 kg            Admission Status:     Observation -                                                              Priority discharge BSA / BMI:         1.90 m2 / 24.37     Department Location:  [facility] Echo Lab                    kg/m2 Blood Pressure: 163 /77 mmHg Study Type:    TRANSTHORACIC ECHO (TTE) COMPLETE Diagnosis/ICD: Transient alteration of awareness-R40.4 Indication:    stroke rule out CPT Codes:     Echo Complete w Full [id number] Patient History: CABG:              CABG x 3. Diabetes:          Yes Pertinent History: HTN. Study Detail: The following Echo studies were performed: 2D, M-Mode, Doppler and               color flow. Image quality for this study is adequate. Agitated               saline used as a contrast agent for intraseptal flow evaluation.  PHYSICIAN INTERPRETATION: Left Ventricle: Left ventricular ejection fraction is normal, by visual estimate at 60-65%. There are no regional wall motion abnormalities. The left ventricular cavity size is normal. Spectral Doppler shows an impaired relaxation pattern of left ventricular diastolic filling. Left Atrium: The left atrium is normal in size. Right Ventricle: The right ventricle is normal in size. There is normal right ventricular global systolic function. Right Atrium: The right atrium is normal in size. Aortic Valve: The aortic valve is trileaflet. There is mild to moderate aortic valve cusp calcification. There is evidence of mildly elevated transaortic gradients consistent with sclerosis of the aortic valve Patients with severe VHD should be evaluated by a multidisciplinary Heart  Valve Team when intervention is considered. (Class I, Level of Evidence: C) Consultation with or referral to a Heart Valve Center of Excellence is reasonable when discussing treatment options for 1) asymptomatic patients with severe VHD, 2) patients who may benefit from valve repair versus valve replacement, or 3) patients with multiple comorbidities for whom valve intervention is considered. (Class IIb, Level of Evidence: C) Circulation. 2014 Enmanuel 10;129(79):8429-04. The aortic valve dimensionless index is 0.52. There is no evidence of aortic valve regurgitation. The peak instantaneous gradient of the aortic valve is 11.4 mmHg. The mean gradient of the aortic valve is 6.0 mmHg. Mitral Valve: The mitral valve is normal in structure. There is no evidence of mitral valve regurgitation. Tricuspid Valve: The tricuspid valve is structurally normal. There is trace tricuspid regurgitation. The Doppler estimated RVSP is within normal limits at 20.6 mmHg. Pulmonic Valve: The pulmonic valve is structurally normal. There is mild pulmonic valve regurgitation. Pericardium: No pericardial effusion noted. Aorta: The aortic root is normal. There is no dilatation of the ascending aorta. There is no dilatation of the aortic root. Pulmonary Artery: The Doppler estimated pulmonary artery diastolic pressure is 8.7 mmHg. Systemic Veins: The inferior vena cava appears normal in size, with IVC inspiratory collapse less than 50%.  CONCLUSIONS:  1. Left ventricular ejection fraction is normal, by visual estimate at 60-65%.  2. Spectral Doppler shows an impaired relaxation pattern of left ventricular diastolic filling.  3. There is normal right ventricular global systolic function.  4. Right ventricular within normal limits.  5. Aortic valve sclerosis. QUANTITATIVE DATA SUMMARY:  2D MEASUREMENTS:           Normal Ranges: LAs:             3.60 cm   (2.7-4.0cm) IVSd:            1.17 cm   (0.6-1.1cm) LVPWd:           1.00 cm   (0.6-1.1cm) LVIDd:            3.50 cm   (3.9-5.9cm) LVIDs:           2.27 cm LV Mass Index:   61.2 g/m2 LV % FS          35.1 %  LA VOLUME:                  Normal Ranges: LA Area A4C:      9.5 cm2 LA Area A2C:      6.7 cm2 LA Volume Index:  8.4 ml/m2 LA Vol A4C:       18.0 ml LA Vol A2C:       11.0 ml LA Vol Index BSA: 7.6 ml/m2 LA Vol BP:        16.0 ml  M-MODE MEASUREMENTS:         Normal Ranges: Ao Root:             3.00 cm (2.0-3.7cm) AoV Exc:             1.40 cm (1.5-2.5cm)  AORTA MEASUREMENTS:         Normal Ranges: AoV Exc:            1.40 cm (1.5-2.5cm) Ao Sinus, d:        3.40 cm (2.1-3.5cm) Ao STJ, d:          2.70 cm (1.7-3.4cm) Asc Ao, d:          3.00 cm (2.1-3.4cm)  LV SYSTOLIC FUNCTION BY 2D PLANIMETRY (MOD):                      Normal Ranges: EF-A4C View:    61 % (>=55%) EF-A2C View:    67 % EF-Biplane:     64 % EF-Visual:      63 % LV EF Reported: 63 %  LV DIASTOLIC FUNCTION:           Normal Ranges: MV Peak E:             0.81 m/s  (0.7-1.2 m/s) MV Peak A:             0.96 m/s  (0.42-0.7 m/s) E/A Ratio:             0.84      (1.0-2.2) MV e'                  0.046 m/s (>8.0) MV lateral e'          0.05 m/s MV medial e'           0.04 m/s E/e' Ratio:            17.66     (<8.0)  MITRAL VALVE:          Normal Ranges: MV DT:        306 msec (150-240msec)  AORTIC VALVE:                      Normal Ranges: AoV Vmax:                1.69 m/s  (<=1.7m/s) AoV Peak P.4 mmHg (<20mmHg) AoV Mean P.0 mmHg  (1.7-11.5mmHg) LVOT Max Chadwick:            1.06 m/s  (<=1.1m/s) AoV VTI:                 34.50 cm  (18-25cm) LVOT VTI:                18.10 cm LVOT Diameter:           1.90 cm   (1.8-2.4cm) AoV Area, VTI:           1.41 cm2  (2.5-5.5cm2) AoV Area,Vmax:           1.69 cm2  (2.5-4.5cm2) AoV Dimensionless Index: 0.52  RIGHT VENTRICLE: RV Basal 3.50 cm RV Mid   2.80 cm RV Major 6.5 cm TAPSE:   16.0 mm RV s'    0.10 m/s  TRICUSPID VALVE/RVSP:          Normal Ranges: Peak TR Velocity:     2.10 m/s RV Syst  "Pressure:     21 mmHg  (< 30mmHg)  PULMONIC VALVE:          Normal Ranges: PV Accel Time:  155 msec (>120ms) PIEDV:          1.19 m/s PADP:           8.7 mmHg  [id number] Olegario Howard MD Electronically signed on 10/24/2024 at 1:06:12 PM  ** Final **       No nuclear medicine results found for the past 12 months    No valid procedures specified.    EKG:   No results found for: \"EKG\"    Radiology:     No orders to display     ASSESSMENT     Problem List Items Addressed This Visit       Essential hypertension    Relevant Orders    Follow Up In Cardiology    Atherosclerosis of coronary artery of native heart without angina pectoris    Relevant Orders    Follow Up In Cardiology    Mixed hyperlipidemia    Stage 3a chronic kidney disease (Multi)    Paroxysmal atrial fibrillation (Multi)    Relevant Orders    TSH with reflex to Free T4 if abnormal    Follow Up In Cardiology    Feels cold - Primary    Relevant Orders    TSH with reflex to Free T4 if abnormal    Chronic anticoagulation       PLAN   1.  Feels cold.  Patient attributes this to his oral anticoagulation with Eliquis.  This would be unusual but a lot of people start to feel Coldaside become older.  I recommend checking his TSH with a reflex free T4 if abnormal.  Asked him to speak to his primary care physician about this.  If they feel it could be the medication I have asked him to check in when he gets down to his last 1 to 2 weeks of Eliquis let me know and we can transition him to Xarelto.  This is an attempt to change this as the patient is disturbed by the fact that he has to put a blanket on in his own home.  Possibly could be related to his medication I feel it is more likely his sedentary status and age.  2.  Paroxysmal atrial fibrillation on chronic anticoagulation.  An irregular or sinus rhythm today.  No bleeding complications no signs of any anemia.  3.  Coronary artery disease stable without angina pectoris and history of coronary revascularization.  " Continue risk factor modification and conservative medical therapy.  4.  Hypertension.  Blood pressures are controlled on the current medical regimen continue the same.  5.  Mixed hyperlipidemia.  Last LDL cholesterol at goal.  6.  Chronic renal insufficiency.  Patient when he turns 82 would qualify for reduced dose of his Eliquis right now his current dosing is appropriate.    Will see the patient in follow-up in 6 to 9 months as long as he stable.  Of asked him to contact us if he and his primary care physician feel that he should be tried on a different anticoagulation regimen and we will be happy to give him the instructions and order to do so.                     [1] No Known Allergies

## 2025-06-09 ENCOUNTER — APPOINTMENT (OUTPATIENT)
Dept: UROLOGY | Facility: CLINIC | Age: 80
End: 2025-06-09
Payer: MEDICARE

## 2025-06-09 VITALS — SYSTOLIC BLOOD PRESSURE: 160 MMHG | HEART RATE: 76 BPM | DIASTOLIC BLOOD PRESSURE: 76 MMHG

## 2025-06-09 DIAGNOSIS — N40.1 BPH WITH OBSTRUCTION/LOWER URINARY TRACT SYMPTOMS: ICD-10-CM

## 2025-06-09 DIAGNOSIS — N13.8 BPH WITH OBSTRUCTION/LOWER URINARY TRACT SYMPTOMS: ICD-10-CM

## 2025-06-09 DIAGNOSIS — N39.41 URGE INCONTINENCE OF URINE: Primary | ICD-10-CM

## 2025-06-09 PROCEDURE — G2211 COMPLEX E/M VISIT ADD ON: HCPCS | Performed by: UROLOGY

## 2025-06-09 PROCEDURE — 1160F RVW MEDS BY RX/DR IN RCRD: CPT | Performed by: UROLOGY

## 2025-06-09 PROCEDURE — 3078F DIAST BP <80 MM HG: CPT | Performed by: UROLOGY

## 2025-06-09 PROCEDURE — 99214 OFFICE O/P EST MOD 30 MIN: CPT | Performed by: UROLOGY

## 2025-06-09 PROCEDURE — 3077F SYST BP >= 140 MM HG: CPT | Performed by: UROLOGY

## 2025-06-09 PROCEDURE — 1159F MED LIST DOCD IN RCRD: CPT | Performed by: UROLOGY

## 2025-06-09 RX ORDER — GLYCOPYRROLATE 1 MG/1
1 TABLET ORAL
COMMUNITY
Start: 2025-05-07 | End: 2026-05-07

## 2025-06-09 NOTE — PROGRESS NOTES
PAST UROLOGICAL HISTORY:  This 72-year-old man has a history of lower urinary tract symptoms (LUTS) with urodynamics showing good detrusor pressure but peak flow of only 7 ml/second, significant detrusor overactivity with leakage, and post-void residual (PVR) ranging from 118 to 315 cc. He has been on Flomax and trospium without significant improvement. Most recent PSA was 1.95. Cystoscopy showed modestly enlarged prostate with elevated median bar and 3+ trabeculation.    Imaging, labs, and tests were independently reviewed and interpreted as noted herein.    HPI TODAY 06/09/2025:    Lower Urinary Tract Symptoms:  - Mr. Vogt presents today for follow-up of LUTS, last seen in early March 2025.  - He reports continued urinary frequency, urgency, double voiding, nocturia 2x, occasional nocturnal incontinence, and uses one Depends per day.  - No improvement with Flomax and trospium therapy.  - Previously discussed green light laser of prostate with bladder Botox, but he requested time to consider options.  - He is accompanied by his daughter today for discussion of treatment options.    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     PHYSICAL EXAMINATION:  Gen: NAD  Pulm: No increased WOB on RA  Cards: WWP    ASSESSMENT/PLAN:    Benign Prostatic Hyperplasia with Lower Urinary Tract Symptoms (N40.1)  - Assessment: Moderate BPH with  significant LUTS including frequency, urgency, double voiding, nocturia, and occasional incontinence. Urodynamics show good detrusor pressure but peak flow of only 7 ml/second with significant detrusor overactivity. Cystoscopy revealed modestly enlarged prostate with elevated median bar and 3+ trabeculation. PVR ranges from 118-315 cc. Failed medical management with Flomax and trospium.  - Plan:    - Recommended green light laser prostatectomy with concurrent bladder Botox injection    - Procedure to be performed at [place] as outpatient    - Discontinue Eliquis for 3 days before and 2 days after procedure    - Catheter placement for 3-5 days post-procedure    - Catheter removal at office    - Follow-up appointment in 6-8 weeks  - Counseling: Risks, benefits, and alternatives were discussed including but not limited to bleeding, urinary tract infection, temporary worsening of LUTS during healing period of 1-1.5 months, and 1-2% risk of permanent incontinence. Green light laser chosen specifically due to lower bleeding risk with Eliquis. Discussed possibility of persistent overactive bladder symptoms despite relief of outlet obstruction due to long-standing bladder changes. Alternative of continuing current management was discussed.    Overactive Bladder (N32.81)  - Assessment: Significant detrusor overactivity with urge incontinence demonstrated on urodynamics. Bladder capacity approximately 180 cc. Patient reports urinary frequency, urgency, and occasional nocturnal incontinence.  - Plan:    - Bladder Botox injection to be performed concurrently with green light laser prostatectomy    - Discontinuation of trospium following procedure if symptoms improve  - Counseling: Risks, benefits, and alternatives were discussed including but not limited to helping reduce urgency, frequency, and leakage episodes. Explained that some overactive bladder symptoms may persist despite treatment due to long-standing bladder changes.

## 2025-06-26 ENCOUNTER — HOSPITAL ENCOUNTER (OUTPATIENT)
Dept: RADIOLOGY | Facility: CLINIC | Age: 80
Discharge: HOME | End: 2025-06-26
Payer: MEDICARE

## 2025-06-26 DIAGNOSIS — F03.A0 UNSPECIFIED DEMENTIA, MILD, WITHOUT BEHAVIORAL DISTURBANCE, PSYCHOTIC DISTURBANCE, MOOD DISTURBANCE, AND ANXIETY: ICD-10-CM

## 2025-06-26 PROCEDURE — 78814 PET IMAGE W/CT LMTD: CPT

## 2025-06-26 PROCEDURE — 3430000001 HC RX 343 DIAGNOSTIC RADIOPHARMACEUTICALS

## 2025-06-26 PROCEDURE — A9586 FLORBETAPIR F18: HCPCS

## 2025-06-26 RX ADMIN — FLORBETAPIR F 18 11.1 MILLICURIE: 51 INJECTION, SOLUTION INTRAVENOUS at 13:10

## 2025-07-01 ENCOUNTER — APPOINTMENT (OUTPATIENT)
Dept: CARDIOLOGY | Facility: CLINIC | Age: 80
End: 2025-07-01
Payer: MEDICARE

## 2025-07-01 DIAGNOSIS — N32.81 OVERACTIVE BLADDER: ICD-10-CM

## 2025-07-01 DIAGNOSIS — R79.1 ABNORMAL COAGULATION PROFILE: ICD-10-CM

## 2025-07-01 DIAGNOSIS — N40.1 BPH WITH LOWER URINARY TRACT SYMPTOMS WITHOUT URINARY OBSTRUCTION: Primary | ICD-10-CM

## 2025-07-01 RX ORDER — ACETAMINOPHEN 325 MG/1
975 TABLET ORAL ONCE
OUTPATIENT
Start: 2025-07-01 | End: 2025-07-01

## 2025-07-02 RX ORDER — MEMANTINE HYDROCHLORIDE 5 MG/1
5 TABLET ORAL 2 TIMES DAILY
COMMUNITY
Start: 2025-06-12 | End: 2025-09-10

## 2025-07-07 NOTE — PREPROCEDURE INSTRUCTIONS

## 2025-07-08 ENCOUNTER — HOSPITAL ENCOUNTER (EMERGENCY)
Facility: HOSPITAL | Age: 80
Discharge: STILL ADMITTED OR ADMINISTRATIVE | End: 2025-07-08
Payer: MEDICARE

## 2025-07-08 PROCEDURE — 4500999001 HC ED NO CHARGE

## 2025-07-09 ENCOUNTER — LAB (OUTPATIENT)
Dept: LAB | Facility: HOSPITAL | Age: 80
End: 2025-07-09
Payer: MEDICARE

## 2025-07-09 DIAGNOSIS — R79.1 ABNORMAL COAGULATION PROFILE: Primary | ICD-10-CM

## 2025-07-09 DIAGNOSIS — N32.81 OVERACTIVE BLADDER: ICD-10-CM

## 2025-07-09 LAB
ANION GAP SERPL CALC-SCNC: 12 MMOL/L (ref 10–20)
BUN SERPL-MCNC: 12 MG/DL (ref 6–23)
CALCIUM SERPL-MCNC: 9.4 MG/DL (ref 8.6–10.3)
CHLORIDE SERPL-SCNC: 104 MMOL/L (ref 98–107)
CO2 SERPL-SCNC: 30 MMOL/L (ref 21–32)
CREAT SERPL-MCNC: 1.65 MG/DL (ref 0.5–1.3)
EGFRCR SERPLBLD CKD-EPI 2021: 42 ML/MIN/1.73M*2
ERYTHROCYTE [DISTWIDTH] IN BLOOD BY AUTOMATED COUNT: 13 % (ref 11.5–14.5)
GLUCOSE SERPL-MCNC: 126 MG/DL (ref 74–99)
HCT VFR BLD AUTO: 38.2 % (ref 41–52)
HGB BLD-MCNC: 12.7 G/DL (ref 13.5–17.5)
MCH RBC QN AUTO: 29.7 PG (ref 26–34)
MCHC RBC AUTO-ENTMCNC: 33.2 G/DL (ref 32–36)
MCV RBC AUTO: 89 FL (ref 80–100)
NRBC BLD-RTO: 0 /100 WBCS (ref 0–0)
PLATELET # BLD AUTO: 170 X10*3/UL (ref 150–450)
POTASSIUM SERPL-SCNC: 4.2 MMOL/L (ref 3.5–5.3)
RBC # BLD AUTO: 4.28 X10*6/UL (ref 4.5–5.9)
SODIUM SERPL-SCNC: 142 MMOL/L (ref 136–145)
WBC # BLD AUTO: 5.3 X10*3/UL (ref 4.4–11.3)

## 2025-07-09 PROCEDURE — 85027 COMPLETE CBC AUTOMATED: CPT

## 2025-07-09 PROCEDURE — 36415 COLL VENOUS BLD VENIPUNCTURE: CPT

## 2025-07-09 PROCEDURE — 87086 URINE CULTURE/COLONY COUNT: CPT

## 2025-07-09 PROCEDURE — 80048 BASIC METABOLIC PNL TOTAL CA: CPT

## 2025-07-10 LAB — BACTERIA UR CULT: NO GROWTH

## 2025-07-14 ENCOUNTER — ANESTHESIA EVENT (OUTPATIENT)
Dept: OPERATING ROOM | Facility: HOSPITAL | Age: 80
End: 2025-07-14
Payer: MEDICARE

## 2025-07-14 PROBLEM — H91.90 HEARING LOSS: Status: ACTIVE | Noted: 2025-07-14

## 2025-07-14 PROBLEM — D64.9 ANEMIA: Status: ACTIVE | Noted: 2025-07-14

## 2025-07-14 PROBLEM — E11.9 DIABETES MELLITUS, TYPE 2 (MULTI): Status: ACTIVE | Noted: 2025-07-14

## 2025-07-14 SDOH — HEALTH STABILITY: MENTAL HEALTH: CURRENT SMOKER: 0

## 2025-07-14 NOTE — ANESTHESIA PREPROCEDURE EVALUATION
Patient: Edis Vogt    Procedure Information       Date/Time: 07/15/25 1155    Procedures:       Greenlight laser of the prostate - diabetic; patient's daughter requests morning time for procedure if possible (after 0730) due to transportation concerns. thanks  [employer] conf. # [id number]      CYSTOSCOPY, WITH BOTULINUM TOXIN INJECTION    Location: [facility] / [facility]    Surgeons: Long Antunez MD            Relevant Problems   Anesthesia (within normal limits)      Cardiac  EKG: Normal sinus rhythm  Normal ECG  When compared with ECG of 07-JUN-2022 10:37,  Nonspecific T wave abnormality no longer evident in Lateral leads    Echo:  1. Left ventricular ejection fraction is normal, by visual estimate at 60-65%.   2. Spectral Doppler shows an impaired relaxation pattern of left ventricular diastolic filling.   3. There is normal right ventricular global systolic function.   4. Right ventricular within normal limits.   5. Aortic valve sclerosis.     (+) Atherosclerosis of coronary artery of native heart without angina pectoris   (+) Essential hypertension   (+) Mixed hyperlipidemia   (+) Paroxysmal atrial fibrillation (Multi)      Neuro   (+) Diabetic peripheral neuropathy associated with type 2 diabetes mellitus   (+) Major depressive disorder, single episode, moderate (Multi)      GI   (+) GERD without esophagitis      /Renal   (+) BPH with lower urinary tract symptoms without urinary obstruction   (+) Hyperplasia of prostate without lower urinary tract symptoms (LUTS)      Endocrine   (+) Diabetes mellitus, type 2 (Multi)   (+) Diabetic peripheral neuropathy associated with type 2 diabetes mellitus      Hematology   (+) Anemia   (+) Chronic anticoagulation      Musculoskeletal   (+) Lumbosacral spondylosis without myelopathy   (+) Spinal stenosis, lumbar region, with neurogenic claudication      HEENT   (+) Hearing loss      Nervous   (+) Confusion   (+) Essential tremor      Circulatory   (+)  S/P CABG x 3      Genitourinary   (+) Overactive bladder   (+) Stage 3a chronic kidney disease (Multi)      Musculoskeletal and Injuries   (+) Chronic back pain       Clinical information reviewed:   Tobacco  Allergies  Meds   Med Hx  Surg Hx   Fam Hx  Soc Hx        NPO Detail:  NPO/Void Status  Date of Last Liquid: 07/14/25  Time of Last Liquid: 2100  Date of Last Solid: 07/14/25  Time of Last Solid: 1730         Physical Exam    Airway  Mallampati: III  TM distance: >3 FB  Neck ROM: full  Mouth opening: 3 or more finger widths     Cardiovascular    Dental     (+) upper dentures, lower dentures     Pulmonary - normal exam   Abdominal - normal exam           Anesthesia Plan    History of general anesthesia?: yes  History of complications of general anesthesia?: no    ASA 3     general   (LMA v ETT)  The patient is not a current smoker.  Education provided regarding risk of obstructive sleep apnea.  intravenous induction   Anesthetic plan and risks discussed with patient.  Use of blood products discussed with patient who consented to blood products.    Plan discussed with CRNA.

## 2025-07-15 ENCOUNTER — ANESTHESIA (OUTPATIENT)
Dept: OPERATING ROOM | Facility: HOSPITAL | Age: 80
End: 2025-07-15
Payer: MEDICARE

## 2025-07-15 ENCOUNTER — HOSPITAL ENCOUNTER (OUTPATIENT)
Facility: HOSPITAL | Age: 80
Setting detail: OUTPATIENT SURGERY
Discharge: HOME | End: 2025-07-15
Attending: UROLOGY | Admitting: UROLOGY
Payer: MEDICARE

## 2025-07-15 VITALS
SYSTOLIC BLOOD PRESSURE: 168 MMHG | DIASTOLIC BLOOD PRESSURE: 77 MMHG | HEIGHT: 69 IN | WEIGHT: 168.21 LBS | TEMPERATURE: 96.8 F | OXYGEN SATURATION: 97 % | HEART RATE: 68 BPM | RESPIRATION RATE: 16 BRPM | BODY MASS INDEX: 24.91 KG/M2

## 2025-07-15 DIAGNOSIS — N40.1 BPH WITH LOWER URINARY TRACT SYMPTOMS WITHOUT URINARY OBSTRUCTION: ICD-10-CM

## 2025-07-15 DIAGNOSIS — I48.91 ATRIAL FIBRILLATION, UNSPECIFIED TYPE (MULTI): ICD-10-CM

## 2025-07-15 DIAGNOSIS — N32.81 OVERACTIVE BLADDER: Primary | ICD-10-CM

## 2025-07-15 LAB
GLUCOSE BLD MANUAL STRIP-MCNC: 102 MG/DL (ref 74–99)
GLUCOSE BLD MANUAL STRIP-MCNC: 108 MG/DL (ref 74–99)

## 2025-07-15 PROCEDURE — 2500000004 HC RX 250 GENERAL PHARMACY W/ HCPCS (ALT 636 FOR OP/ED): Performed by: STUDENT IN AN ORGANIZED HEALTH CARE EDUCATION/TRAINING PROGRAM

## 2025-07-15 PROCEDURE — 3700000002 HC GENERAL ANESTHESIA TIME - EACH INCREMENTAL 1 MINUTE: Performed by: UROLOGY

## 2025-07-15 PROCEDURE — 2500000001 HC RX 250 WO HCPCS SELF ADMINISTERED DRUGS (ALT 637 FOR MEDICARE OP): Performed by: UROLOGY

## 2025-07-15 PROCEDURE — 3600000005 HC OR TIME - INITIAL BASE CHARGE - PROCEDURE LEVEL FIVE: Performed by: UROLOGY

## 2025-07-15 PROCEDURE — 51702 INSERT TEMP BLADDER CATH: CPT | Mod: CCI

## 2025-07-15 PROCEDURE — 3600000010 HC OR TIME - EACH INCREMENTAL 1 MINUTE - PROCEDURE LEVEL FIVE: Performed by: UROLOGY

## 2025-07-15 PROCEDURE — 51700 IRRIGATION OF BLADDER: CPT | Mod: CCI

## 2025-07-15 PROCEDURE — 52287 CYSTOSCOPY CHEMODENERVATION: CPT | Performed by: UROLOGY

## 2025-07-15 PROCEDURE — 7100000010 HC PHASE TWO TIME - EACH INCREMENTAL 1 MINUTE: Performed by: UROLOGY

## 2025-07-15 PROCEDURE — 2500000004 HC RX 250 GENERAL PHARMACY W/ HCPCS (ALT 636 FOR OP/ED): Mod: JZ | Performed by: UROLOGY

## 2025-07-15 PROCEDURE — 2500000004 HC RX 250 GENERAL PHARMACY W/ HCPCS (ALT 636 FOR OP/ED)

## 2025-07-15 PROCEDURE — 2500000004 HC RX 250 GENERAL PHARMACY W/ HCPCS (ALT 636 FOR OP/ED): Performed by: UROLOGY

## 2025-07-15 PROCEDURE — 7100000009 HC PHASE TWO TIME - INITIAL BASE CHARGE: Performed by: UROLOGY

## 2025-07-15 PROCEDURE — 3700000001 HC GENERAL ANESTHESIA TIME - INITIAL BASE CHARGE: Performed by: UROLOGY

## 2025-07-15 PROCEDURE — 52648 LASER SURGERY OF PROSTATE: CPT | Performed by: PHYSICIAN ASSISTANT

## 2025-07-15 PROCEDURE — 52648 LASER SURGERY OF PROSTATE: CPT | Performed by: UROLOGY

## 2025-07-15 PROCEDURE — 7100000002 HC RECOVERY ROOM TIME - EACH INCREMENTAL 1 MINUTE: Performed by: UROLOGY

## 2025-07-15 PROCEDURE — 2500000005 HC RX 250 GENERAL PHARMACY W/O HCPCS: Performed by: UROLOGY

## 2025-07-15 PROCEDURE — 82947 ASSAY GLUCOSE BLOOD QUANT: CPT

## 2025-07-15 PROCEDURE — 2720000007 HC OR 272 NO HCPCS: Performed by: UROLOGY

## 2025-07-15 PROCEDURE — 7100000001 HC RECOVERY ROOM TIME - INITIAL BASE CHARGE: Performed by: UROLOGY

## 2025-07-15 RX ORDER — OXYCODONE HYDROCHLORIDE 5 MG/1
10 TABLET ORAL EVERY 4 HOURS PRN
Status: DISCONTINUED | OUTPATIENT
Start: 2025-07-15 | End: 2025-07-15 | Stop reason: HOSPADM

## 2025-07-15 RX ORDER — METOCLOPRAMIDE HYDROCHLORIDE 5 MG/ML
10 INJECTION INTRAMUSCULAR; INTRAVENOUS ONCE AS NEEDED
Status: DISCONTINUED | OUTPATIENT
Start: 2025-07-15 | End: 2025-07-15 | Stop reason: HOSPADM

## 2025-07-15 RX ORDER — ONDANSETRON HYDROCHLORIDE 2 MG/ML
4 INJECTION, SOLUTION INTRAVENOUS ONCE AS NEEDED
Status: DISCONTINUED | OUTPATIENT
Start: 2025-07-15 | End: 2025-07-15 | Stop reason: HOSPADM

## 2025-07-15 RX ORDER — CEPHALEXIN 500 MG/1
500 CAPSULE ORAL 3 TIMES DAILY
Qty: 9 CAPSULE | Refills: 0 | Status: SHIPPED | OUTPATIENT
Start: 2025-07-15 | End: 2025-07-18

## 2025-07-15 RX ORDER — ACETAMINOPHEN 325 MG/1
650 TABLET ORAL EVERY 4 HOURS PRN
Status: DISCONTINUED | OUTPATIENT
Start: 2025-07-15 | End: 2025-07-15 | Stop reason: HOSPADM

## 2025-07-15 RX ORDER — PROPOFOL 10 MG/ML
INJECTION, EMULSION INTRAVENOUS AS NEEDED
Status: DISCONTINUED | OUTPATIENT
Start: 2025-07-15 | End: 2025-07-15

## 2025-07-15 RX ORDER — PHENAZOPYRIDINE HYDROCHLORIDE 200 MG/1
200 TABLET, FILM COATED ORAL 3 TIMES DAILY PRN
Qty: 15 TABLET | Refills: 0 | Status: SHIPPED | OUTPATIENT
Start: 2025-07-15 | End: 2025-07-20

## 2025-07-15 RX ORDER — LIDOCAINE HYDROCHLORIDE 20 MG/ML
JELLY TOPICAL AS NEEDED
Status: DISCONTINUED | OUTPATIENT
Start: 2025-07-15 | End: 2025-07-15 | Stop reason: HOSPADM

## 2025-07-15 RX ORDER — FENTANYL CITRATE 50 UG/ML
25 INJECTION, SOLUTION INTRAMUSCULAR; INTRAVENOUS EVERY 5 MIN PRN
Status: DISCONTINUED | OUTPATIENT
Start: 2025-07-15 | End: 2025-07-15 | Stop reason: HOSPADM

## 2025-07-15 RX ORDER — CEFAZOLIN SODIUM 2 G/50ML
2 SOLUTION INTRAVENOUS ONCE
Status: COMPLETED | OUTPATIENT
Start: 2025-07-15 | End: 2025-07-15

## 2025-07-15 RX ORDER — SODIUM CHLORIDE, SODIUM LACTATE, POTASSIUM CHLORIDE, CALCIUM CHLORIDE 600; 310; 30; 20 MG/100ML; MG/100ML; MG/100ML; MG/100ML
50 INJECTION, SOLUTION INTRAVENOUS CONTINUOUS
Status: DISCONTINUED | OUTPATIENT
Start: 2025-07-15 | End: 2025-07-15 | Stop reason: HOSPADM

## 2025-07-15 RX ORDER — ALBUTEROL SULFATE 0.83 MG/ML
2.5 SOLUTION RESPIRATORY (INHALATION) ONCE AS NEEDED
Status: DISCONTINUED | OUTPATIENT
Start: 2025-07-15 | End: 2025-07-15 | Stop reason: HOSPADM

## 2025-07-15 RX ORDER — LABETALOL HYDROCHLORIDE 5 MG/ML
5 INJECTION, SOLUTION INTRAVENOUS ONCE AS NEEDED
Status: DISCONTINUED | OUTPATIENT
Start: 2025-07-15 | End: 2025-07-15 | Stop reason: HOSPADM

## 2025-07-15 RX ORDER — LIDOCAINE HYDROCHLORIDE 10 MG/ML
0.1 INJECTION, SOLUTION EPIDURAL; INFILTRATION; INTRACAUDAL; PERINEURAL ONCE
Status: DISCONTINUED | OUTPATIENT
Start: 2025-07-15 | End: 2025-07-15 | Stop reason: HOSPADM

## 2025-07-15 RX ORDER — ONDANSETRON HYDROCHLORIDE 2 MG/ML
INJECTION, SOLUTION INTRAVENOUS AS NEEDED
Status: DISCONTINUED | OUTPATIENT
Start: 2025-07-15 | End: 2025-07-15

## 2025-07-15 RX ORDER — HYDRALAZINE HYDROCHLORIDE 20 MG/ML
5 INJECTION INTRAMUSCULAR; INTRAVENOUS EVERY 30 MIN PRN
Status: DISCONTINUED | OUTPATIENT
Start: 2025-07-15 | End: 2025-07-15 | Stop reason: HOSPADM

## 2025-07-15 RX ORDER — OXYCODONE HYDROCHLORIDE 5 MG/1
5 TABLET ORAL EVERY 4 HOURS PRN
Status: DISCONTINUED | OUTPATIENT
Start: 2025-07-15 | End: 2025-07-15 | Stop reason: HOSPADM

## 2025-07-15 RX ORDER — SODIUM CHLORIDE 0.9 G/100ML
INJECTION, SOLUTION IRRIGATION AS NEEDED
Status: DISCONTINUED | OUTPATIENT
Start: 2025-07-15 | End: 2025-07-15 | Stop reason: HOSPADM

## 2025-07-15 RX ORDER — FENTANYL CITRATE 50 UG/ML
INJECTION, SOLUTION INTRAMUSCULAR; INTRAVENOUS AS NEEDED
Status: DISCONTINUED | OUTPATIENT
Start: 2025-07-15 | End: 2025-07-15

## 2025-07-15 RX ORDER — FENTANYL CITRATE 50 UG/ML
12.5 INJECTION, SOLUTION INTRAMUSCULAR; INTRAVENOUS EVERY 5 MIN PRN
Status: DISCONTINUED | OUTPATIENT
Start: 2025-07-15 | End: 2025-07-15 | Stop reason: HOSPADM

## 2025-07-15 RX ORDER — ACETAMINOPHEN 325 MG/1
975 TABLET ORAL ONCE
Status: COMPLETED | OUTPATIENT
Start: 2025-07-15 | End: 2025-07-15

## 2025-07-15 RX ORDER — SODIUM CHLORIDE, SODIUM LACTATE, POTASSIUM CHLORIDE, CALCIUM CHLORIDE 600; 310; 30; 20 MG/100ML; MG/100ML; MG/100ML; MG/100ML
100 INJECTION, SOLUTION INTRAVENOUS CONTINUOUS
Status: DISCONTINUED | OUTPATIENT
Start: 2025-07-15 | End: 2025-07-15 | Stop reason: HOSPADM

## 2025-07-15 RX ORDER — LIDOCAINE HYDROCHLORIDE 20 MG/ML
INJECTION, SOLUTION INFILTRATION; PERINEURAL AS NEEDED
Status: DISCONTINUED | OUTPATIENT
Start: 2025-07-15 | End: 2025-07-15

## 2025-07-15 RX ADMIN — ONDANSETRON 4 MG: 2 INJECTION, SOLUTION INTRAMUSCULAR; INTRAVENOUS at 13:02

## 2025-07-15 RX ADMIN — LIDOCAINE HYDROCHLORIDE 50 MG: 20 INJECTION, SOLUTION INFILTRATION; PERINEURAL at 12:55

## 2025-07-15 RX ADMIN — PROPOFOL 200 MG: 10 INJECTION, EMULSION INTRAVENOUS at 12:55

## 2025-07-15 RX ADMIN — FENTANYL CITRATE 25 MCG: 50 INJECTION, SOLUTION INTRAMUSCULAR; INTRAVENOUS at 14:02

## 2025-07-15 RX ADMIN — HYDROMORPHONE HYDROCHLORIDE 0.5 MG: 1 INJECTION, SOLUTION INTRAMUSCULAR; INTRAVENOUS; SUBCUTANEOUS at 14:45

## 2025-07-15 RX ADMIN — HYDROMORPHONE HYDROCHLORIDE 0.5 MG: 1 INJECTION, SOLUTION INTRAMUSCULAR; INTRAVENOUS; SUBCUTANEOUS at 14:37

## 2025-07-15 RX ADMIN — FENTANYL CITRATE 50 MCG: 50 INJECTION, SOLUTION INTRAMUSCULAR; INTRAVENOUS at 12:55

## 2025-07-15 RX ADMIN — DEXAMETHASONE SODIUM PHOSPHATE 4 MG: 4 INJECTION, SOLUTION INTRAMUSCULAR; INTRAVENOUS at 13:02

## 2025-07-15 RX ADMIN — ACETAMINOPHEN 975 MG: 325 TABLET ORAL at 10:51

## 2025-07-15 RX ADMIN — FENTANYL CITRATE 25 MCG: 50 INJECTION, SOLUTION INTRAMUSCULAR; INTRAVENOUS at 13:49

## 2025-07-15 RX ADMIN — HYDRALAZINE HYDROCHLORIDE 5 MG: 20 INJECTION INTRAMUSCULAR; INTRAVENOUS at 15:06

## 2025-07-15 RX ADMIN — SODIUM CHLORIDE, SODIUM LACTATE, POTASSIUM CHLORIDE, AND CALCIUM CHLORIDE 50 ML/HR: .6; .31; .03; .02 INJECTION, SOLUTION INTRAVENOUS at 10:51

## 2025-07-15 RX ADMIN — CEFAZOLIN SODIUM 2 G: 2 SOLUTION INTRAVENOUS at 13:02

## 2025-07-15 ASSESSMENT — PAIN SCALES - GENERAL
PAINLEVEL_OUTOF10: 4
PAINLEVEL_OUTOF10: 0 - NO PAIN
PAINLEVEL_OUTOF10: 7
PAINLEVEL_OUTOF10: 2
PAINLEVEL_OUTOF10: 7
PAINLEVEL_OUTOF10: 4
PAINLEVEL_OUTOF10: 5 - MODERATE PAIN
PAINLEVEL_OUTOF10: 0 - NO PAIN
PAINLEVEL_OUTOF10: 0 - NO PAIN
PAINLEVEL_OUTOF10: 4
PAINLEVEL_OUTOF10: 2
PAINLEVEL_OUTOF10: 7
PAINLEVEL_OUTOF10: 0 - NO PAIN
PAIN_LEVEL: 0

## 2025-07-15 ASSESSMENT — COLUMBIA-SUICIDE SEVERITY RATING SCALE - C-SSRS
1. IN THE PAST MONTH, HAVE YOU WISHED YOU WERE DEAD OR WISHED YOU COULD GO TO SLEEP AND NOT WAKE UP?: NO
2. HAVE YOU ACTUALLY HAD ANY THOUGHTS OF KILLING YOURSELF?: NO
6. HAVE YOU EVER DONE ANYTHING, STARTED TO DO ANYTHING, OR PREPARED TO DO ANYTHING TO END YOUR LIFE?: NO

## 2025-07-15 ASSESSMENT — PAIN DESCRIPTION - DESCRIPTORS
DESCRIPTORS: SORE
DESCRIPTORS: SORE

## 2025-07-15 NOTE — NURSING NOTE
VSS, no nausea, pain is reported as tolearble. Patient tolerated PO intake of clear liquids and cookies. Upon review of discharge instructions no further questions remained by patient or family.

## 2025-07-15 NOTE — OP NOTE
Greenlight laser of the prostate, CYSTOSCOPY, WITH BOTULINUM TOXIN INJECTION Operative Note     Date: 7/15/2025  OR Location: [facility]    Name: Edis Vogt, : 1945, Age: 80 y.o., MRN: [id number], Sex: male    Diagnosis  Pre-op Diagnosis      * BPH with lower urinary tract symptoms without urinary obstruction [N40.1]     * Overactive bladder [N32.81] Post-op Diagnosis     * BPH with lower urinary tract symptoms without urinary obstruction [N40.1]     * Overactive bladder [N32.81]     Procedures  Greenlight laser of the prostate  97237 - [place] LASER VAPORIZATION OF PROSTATE FOR URINE FLOW    CYSTOSCOPY, WITH BOTULINUM TOXIN INJECTION  88947 - [place] CYSTOURETHROSCOPY INJ CHEMODENERVATION BLADDER      Surgeons      * Long Antunez - Primary    Resident/Fellow/Other Assistant:  Surgeons and Role:  * No surgeons found with a matching role *    Staff:   Circulator: Zita Pablo Person: Sandhya    Anesthesia Staff: Anesthesiologist: Emily Cho MD  CRNA: CHUNG Sweeney-CRNA  SRNA: Spencer Franco RN    Procedure Summary  Anesthesia: General  ASA: III  Estimated Blood Loss: 3mL  Intra-op Medications:   Administrations occurring from 1155 to 1325 on 07/15/25:   Medication Name Total Dose   lidocaine 2 % mucosal jelly (Uro-Jet) 1 Application   onabotulinumtoxinA (Botox) injection 100 Units   sodium chloride 0.9 % irrigation solution 6,000 mL   ceFAZolin (Ancef) 2 g in dextrose (iso) IV 50 mL 2 g   dexAMETHasone (Decadron) 4 mg/mL 4 mg   fentaNYL PF 0.05 mg/mL 50 mcg   lidocaine (Xylocaine) injection 2 % 50 mg   ondansetron 2 mg/mL 4 mg   propofol (Diprivan) injection 10 mg/mL 200 mg              Anesthesia Record               Intraprocedure I/O Totals          Intake    lactated Ringer's infusion 700.00 mL    ceFAZolin (Ancef) 2 g in dextrose (iso) IV 50 mL 50.00 mL    Total Intake 750 mL          Specimen: No specimens collected              Drains and/or Catheters:   Urethral Catheter Latex 22 Fr. (Active)    Site Assessment Clean;Skin intact 07/15/25 1408   Collection Container Standard drainage bag 07/15/25 1408   Securement Method Securing device (Describe) 07/15/25 1408   Irrigant Normal saline 07/15/25 1408   $ Urethral Catheter Charge Irrigation;Indwelling cath 07/15/25 1408     Operative Indications: 80year old male w/ a history of severe lower urinary tract symptoms and urodynamics suggestive of bladder outlet obstruction.  He has failed medical management and therefore presents for surgical management with transurethral resection of the prostate via photo selective vaporization with combination botulinum toxin 100 units. The patient was counseled regarding the risks, benefits, alternatives, equipment, and personnel involved and consented to proceed with surgical intervention.    Operative Findings:   -100 units of botulinum toxin instilled intradetrusor in 12 different injection sites within the bladder  - PVP performed, large bleeding from the apex of the prostate even prior to resection, multiple sites of bleeding at the apex.  Throughout the case he seemed to be more prone to bleeding than typical.  Ultimately able to open up the fossa well regardless.    Operative Procedure:   The 22.5-Sinhala continuous flow cystourethroscope was inserted into the urethra and advanced to the bladder atraumatically. The urethra was noted to be free from lesions or strictures. The prostate was noted to be hypertrophied with a high median bar.    I started with the bladder Botox.  Using a 5 mm flexible cystoscopic injectable needle I injected 100 units of botulinum toxin into 12 different injection sites within his bladder.  I used 2 cc of saline at the end of the injections to make sure I cleared the line and injected all of the Botox.    The green light laser was then introduced into the cystoscope and initially set to 80 Cantu. Photovaporization of the prostate was initiated at the median lobe of the prostate and at the  bladder neck. Following vaporization of the median lobe, the ureteral orifices were noted to be in the usual orthotopic locations bilaterally. The vaporization was then continued to the lateral lobes. The energy was progressively increased to 180 Cantu as the prostate was systematically vaporized laterally and anteriorly. The location of the verumontanum was frequently checked and care was taken to avoid vaporization of tissue distal to the verumontanum. After vaporization, the prostatic fossa was inspected. A clear open channel of the prostatic fossa to the bladder from the verumonatum was appreciated. The ureteral orifices were again inspected and away from our area of vaporization. The external sphincter was examined and noted to be functional. The cystoscope was then removed, and a 22-Latvian coude was inserted with 20cc of sterile H20. The bladder was irrigated thoroughly with the return of clear urine. The coude catheter was placed on gentle traction to the patient's thigh to gravity drainage. Urine was noted to be draining clear at the conclusion of the case    Counts were correct. Sign-out was performed with the surgical team confirming the specimen listed below. The patient tolerated the procedure well, emerged from anesthesia without incident, and was transferred to PACU in stable condition.    Long Antunze  Phone Number: [phone]

## 2025-07-15 NOTE — ANESTHESIA PROCEDURE NOTES
Airway  Date/Time: 7/15/2025 12:57 PM  Reason: elective    Airway not difficult    Staffing  Performed: [facility]   Authorized by: Emily Cho MD    Performed by: Spencer Franco RN  Patient location during procedure: OR    Patient Condition  Indications for airway management: anesthesia  Patient position: sniffing  Planned trial extubation  Sedation level: deep     Final Airway Details   Preoxygenated: yes  Final airway type: supraglottic airway  Successful airway: Supraglottic airway: Ambu.  Size: 5  Number of attempts at approach: 1

## 2025-07-15 NOTE — H&P
History Of Present Illness  79yo male with severe LUTS refractory to oral medications here for PVP and bladder botox    Past Medical History  Medical History[1]     Medication Documentation Review Audit       Reviewed by Harmony Bhakta RN (Registered Nurse) on 07/15/25 at 1040      Medication Order Taking? Sig Documenting Provider Last Dose Status   apixaban (Eliquis) 5 mg tablet [id number] No Take 1 tablet (5 mg) by mouth 2 times a day. Sandhya Gaytan MD 2025  25 2359   atorvastatin (Lipitor) 80 mg tablet [id number] Yes Take 1 tablet (80 mg) by mouth once daily at bedtime. Adina Nguyen MD 2025 Active   cholestyramine (Questran) 4 gram packet [id number] No Take 1 packet (4 g) by mouth if needed.   Patient not taking: Reported on 2025    Historical MD Wendy Not Taking Flag for Review   donepezil (Aricept) 5 mg tablet [id number] Yes Take 2 tablets (10 mg) by mouth once daily at bedtime. Adina Nguyen MD 2025 Active   famotidine (Pepcid) 40 mg tablet [id number] Yes Take 1 tablet (40 mg) by mouth once daily. Adina Nguyen MD 2025 Active   gabapentin (Neurontin) 600 mg tablet [id number] Yes Take 2 tablets (1,200 mg) by mouth twice a day. Adina Nguyen MD 2025 Active   glycopyrrolate (Robinul) 1 mg tablet [id number] Yes Take 1 tablet (1 mg) by mouth. Adina Nguyen MD 2025 Active   losartan (Cozaar) 50 mg tablet [id number] Yes Take 1 tablet (50 mg) by mouth once daily. Nikko Licea MD 2025 Active   magnesium oxide (Mag-Ox) 400 mg (241.3 mg magnesium) tablet [id number] No Take 1 tablet (400 mg) by mouth once daily.   Patient not taking: Reported on 2025    Historical MD Wendy Not Taking Flag for Review   memantine (Namenda) 5 mg tablet [id number] Yes Take 1 tablet (5 mg) by mouth twice a day. Adina Nguyen MD 2025 Active   metoprolol tartrate (Lopressor) 25 mg tablet [id number] Yes Take 1 tablet (25 mg) by mouth  2 times a day. Sandhya Gaytan MD 7/15/2025  8:30 AM Active   moxifloxacin (Vigamox) 0.5 % ophthalmic solution [id number] No 1 drop.   Patient not taking: Reported on 7/7/2025    Historical Provider, MD Not Taking Flag for Review   nitroglycerin (Nitrostat) 0.4 mg SL tablet [id number] Yes Place 1 tablet (0.4 mg) under the tongue every 5 minutes if needed for chest pain. Historical Provider, MD More than a month Active   prednisoLONE acetate (Pred-Forte) 1 % ophthalmic suspension [id number] No    Patient not taking: Reported on 7/7/2025    Historical MD Wendy Not Taking Flag for Review   psyllium (Metamucil) 3.4 gram packet [id number] No Take 1 packet by mouth once daily as needed (for constipation).   Patient not taking: Reported on 7/7/2025    Historical MD Wendy Not Taking Flag for Review   tamsulosin (Flomax) 0.4 mg 24 hr capsule [id number] Yes Take 1 capsule (0.4 mg) by mouth once daily. Historical MD Wendy 7/14/2025 Active   traZODone (Desyrel) 50 mg tablet [id number] Yes Take 2 tablets (100 mg) by mouth once daily at bedtime. Historical Provider, MD 7/14/2025 Active   trospium (Sanctura) 20 mg tablet [id number] No TAKE ONE TABLET BY MOUTH ONCE DAILY AT BEDTIME   Patient not taking: Reported on 7/7/2025    Suresh Nagel, CHUNG-CNP Not Taking Flag for Review                    Surgical History  Surgical History[2]     Social History  He reports that he has never smoked. His smokeless tobacco use includes chew. He reports that he does not currently use alcohol. He reports that he does not use drugs.    Family History  Family History[3]     Allergies  Patient has no known allergies.    ROS: 12 system review was completed and is negative with the exception of those signs and symptoms noted in the history of present illness: A 12 system review was completed and is negative with the exception of those signs and symptoms noted in the history of present illness.     Exam:  General: in NAD, appears stated  "age  Head: normocephalic, atraumatic  Respiratory: normal effort, no use of accessory muscles  Cardiovascular: no edema noted  Skin: normal turgor, no rashes  Neurologic: grossly intact, oriented to person/place/time  Psychiatric: mode and affect appropriate    Per anesthesiology, clear to auscultation bilaterally with a regular rate and rhythm     Last Recorded Vitals  /77   Pulse 61   Temp 36.1 °C (97 °F) (Temporal)   Resp 18   Ht 1.753 m (5' 9\")   Wt 76.3 kg (168 lb 3.4 oz)   SpO2 96%   BMI 24.84 kg/m²       No results found for: \"URINECULTURE\", \"CREATININE\"      ASSESSMENT/PLAN:  Okay to proceed with pvp and botox    Long Antunez MD         [1]   Past Medical History:  Diagnosis Date    Arrhythmia     a-fib    Atrial fibrillation (Multi)     BPH (benign prostatic hyperplasia)     CKD (chronic kidney disease)     stage 3a    Coronary artery disease     Dementia     Depression     Dizziness     GERD (gastroesophageal reflux disease)     Hearing aid worn     HL (hearing loss)     Hyperlipidemia     Hypertension     Lumbar disc disease     lumbosacral spondylethesis    Nocturia     OAB (overactive bladder)     Other chest pain 02/10/2022    Chest tightness    Other conditions influencing health status 01/06/2018    History of cough    Other hypotension 02/10/2022    Hypotension due to hypovolemia    Peripheral neuropathy     Personal history of other diseases of the circulatory system 12/30/2021    History of abnormal electrocardiography    Personal history of other diseases of the respiratory system 01/06/2018    History of influenza    Spinal stenosis     Type 2 diabetes mellitus     states currently taking no medications - diet controlled    Urinary frequency     Urinary urgency     Vertigo     Wears dentures     Wears glasses    [2]   Past Surgical History:  Procedure Laterality Date    APPENDECTOMY      CARDIAC CATHETERIZATION      CATARACT EXTRACTION      COLONOSCOPY      CORONARY ARTERY BYPASS " GRAFT      EYE SURGERY     [3] No family history on file.

## 2025-07-15 NOTE — DISCHARGE INSTRUCTIONS
Physician Phone List Provided    Post-op Instructions Following Transurethral Resection of the Prostate (TURP)    Procedure Overview  The purpose of a TURP is to remove parts of the prostate gland through the penis to help with urination. This procedure is frequently described as “coring out” the prostate (or sometimes, a rotor-rooter procedure). No incisions are needed. The surgeon reaches the prostate by putting an instrument, called a resectoscope, into the end of the penis and through the urethra. It contains an electrical wire loop that cuts tissue and seals blood vessels. At the conclusion of the procedure, a catheter is placed while your prostate heals.    General Information  You should expect to see some blood in your urine, off-and-on, over the next few weeks while your bladder and prostate heal. Do not be alarmed even if the urine was clear for a period of time. If you are passing urine that is so bloody you cannot see through it (thick like tomato soup), passing large clots, or are unable to urinate, please call our office.     Catheter Care  A catheter was placed while your prostate and bladder heal. After a TURP, the catheter is typically removed in 2-5 days.    While in place, catheters can cause some urinary/pelvic symptoms. They can bump up against the bladder, and make a person feel like their bladder is full, despite their bladder being empty. They can also cause a cramping pain in the lower abdomen. These are called bladder spasms.     The key to bladder catheters is to insure the catheter is draining the urine well from the bladder. Should there be no urine output over 30-60 minutes, and you feel full, you should go to your nearest Emergency Department as your catheter may have become blocked or plugged. Generally speaking, as long as the catheter is draining, it is working correctly. Occasionally, a catheter can be draining correctly, and the patient has a bladder spasm and squirts a little bit  of urine around the catheter. This is okay, as long as it is only a small amount of urine, and the catheter is draining well.    Activity  Do not lift anything over 15 lbs (about a gallon jug of milk) for 2 weeks. Heavy lifting could cause bleeding from your prostate. Walking is encouraged. Bed rest is a thing of the past, the more you're up walking, the better. You can take stairs, just go slowly at first.     Pay close attention to the color of your urine as you increase your activity. If the urine stays clear to light pink, you are doing the right amount of activity. Overexertion may cause urinary bleeding. If you start to pass clots or don't improve, call us.    Hygiene  You may shower immediately after surgery.    Diet  You may return to your normal diet immediately. To keep your urine flowing freely and to avoid constipation, drink plenty of fluids during the day (8-10 glasses). Water is the best.    Bowel Movements  Constipation and straining to have bowel movements may increase bleeding in your urine. If you are having constipation, use an over the counter stool softener (I recommend Miralax).    Pain Control  Tylenol and Ibuprofen are typically sufficient.  Tylenol 1000 mg can be taken every 6 hours. Ibuprofen 600 mg (Motrin or Advil) can also be taken every 6 hours.  You can alternate these medications such that you are taking either Tylenol or ibuprofen every 3 hours.    Occasionally we will prescribe an opioid pain medication postoperatively, ONLY use this for pain not controlled by Tylenol and ibuprofen.    Aleve (naproxen) is an acceptable alternative to ibuprofen.  Oxybutynin (Ditropan) is occasionally given to help with bladder spasms.    Other Medications  Resume your medications unless we tell you otherwise  Do not resume any blood thinners until your doctor specifies when it is okay.    Driving  You can resume driving after you have been off of opioid pain medications for 24 hours and feel you can  press on the breaks suddenly (or other similar movements) if needed.    Problems you should report to your Doctor (contact info below)  Fever greater than 100.5 degrees F  Heavy bleeding or clots  Inability to urinate  Reactions to medication (hives, rash, nausea, vomiting, diarrhea)    Follow-up  We typically arrange your postoperative appointment for you.  If you do not hear from us to schedule this appointment within 1 week after surgery, please call our office.    DR. WATSON'S CONTACT INFORMATION    For administrative questions/issues/concerns during business hours: [phone] - this number will direct you to the voicemail for Dr. Watson's , which is frequently checked during business hours.     For clinical questions during business hours: [phone]. This is the direct line to Dr. Watson's nurse partner, [employer]. We check this regularly during business hours.    For after-hours issues: [phone], this will direct you to our answering service or the resident physician on call if needed.    For medical emergencies, please call 911 or proceed to your nearest emergency room.    Last reviewed and/or updated: May, 2022    General Anesthesia Discharge Instructions    About this topic  You may need general anesthesia if you need to be asleep during a procedure. Your doctor will use drugs to block the signals that go from your nerves to your brain. Doctors give general anesthesia during a surgery or procedure to:  Allow you to sleep  Help your body be still  Relax your muscles  Help you to relax and be pain free  Keep you from remembering the surgery  Let the doctor manage your airway, breathing, and blood flow  The doctor or nurse anesthetist gives general anesthesia by a shot into your vein. Sometimes, you may breathe in a gas through a mask placed over your face.  What care is needed at home?  Ask your doctor what you need to do when you go home. Make sure you  ask questions if you do not understand what the doctor says.  Your doctor may give you drugs to prevent or treat an upset stomach from the anesthetic. Take them as ordered.  If your throat is sore, suck on ice chips or popsicles to ease throat pain.  Put 2 to 3 pillows under your head and back when you lie down to help you breathe easier.  For the first 24 to 48 hours:  Do not operate heavy or dangerous machinery.  Do not make major decisions or sign important papers. You may not be able to think clearly.  Avoid beer, wine, or mixed drinks.  You are at a higher risk of falling for at least 24 hours after general anesthesia.  Take extra care when you get up.  Do not change positions quickly.  Do not rush when you need to go to the bathroom or to answer the phone.  Ask for help if you feel unsteady when you try to walk.  Wear shoes with non-slip soles and low heels.  What follow-up care is needed?  Your doctor may ask you to come back to the office to check on your progress. Be sure to keep these visits.  If you have stitches that do not dissolve or staples, you will need to have them removed. Your doctor will want to do this in 1 to 2 weeks. If the doctor used skin glue, the glue will fall off on its own.  What drugs may be needed?  The doctor may order drugs to:  Help with pain  Treat an upset stomach or throwing up  Will physical activity be limited?  You will not be allowed to drive right away after the procedure. Ask a family member or a friend to drive you home.  Avoid trying to get out of bed without help until you are sure of your balance.  You may have to limit your activity. Talk to your doctor about if you need to limit how much you lift or limit exercise after your procedure.  What changes to diet are needed?  Start with a light diet when you are fully awake. This includes things that are easy to swallow like soups, pudding, jello, toast, and eggs. Slowly progress to your normal diet.  What problems could  happen?  Low blood pressure  Breathing problems  Upset stomach or throwing up  Dizziness  Blood clots  Infection  When do I need to call the doctor?  Trouble breathing  Upset stomach or throwing up more than 3 times in the next 2 days  Dizziness  Teach Back: Helping You Understand  The Teach Back Method helps you understand the information we are giving you. After you talk with the staff, tell them in your own words what you learned. This helps to make sure the staff has described each thing clearly. It also helps to explain things that may have been confusing. Before going home, make sure you can do these:  I can tell you about my procedure.  I can tell you if I need to follow up with my doctor.  I can tell you what is good for me to eat and drink the next day.  I can tell you what I would do if I have trouble breathing, an upset stomach, or dizziness.  Where can I learn more?  [employer]  https://www.nigms.nih.gov/education/pages/factsheet_Anesthesia.aspx  [facility] Choices  http://www.nhs.uk/conditions/Anaesthetic-general/Pages/Definition.aspx  Last Reviewed Date  2020-04-22

## 2025-07-15 NOTE — ANESTHESIA POSTPROCEDURE EVALUATION
Patient: Edis Vogt    Procedure Summary       Date: 07/15/25 Room / Location: [facility] / [facility]    Anesthesia Start: 1249 Anesthesia Stop:     Procedures:       Greenlight laser of the prostate (Prostate)      CYSTOSCOPY, WITH BOTULINUM TOXIN INJECTION (Bladder) Diagnosis:       BPH with lower urinary tract symptoms without urinary obstruction      Overactive bladder      (BPH with lower urinary tract symptoms without urinary obstruction [N40.1])      (Overactive bladder [N32.81])    Surgeons: Long Antunez MD Responsible Provider: Emily Cho MD    Anesthesia Type: general ASA Status: 3            Anesthesia Type: general    Vitals Value Taken Time   /68 07/15/25 14:01   Temp 36 07/15/25 14:06   Pulse 52 07/15/25 14:04   Resp 12 07/15/25 14:04   SpO2 100 % 07/15/25 14:04   Vitals shown include unfiled device data.    Anesthesia Post Evaluation    Patient location during evaluation: PACU  Patient participation: complete - patient participated  Level of consciousness: awake and alert  Pain score: 0  Pain management: adequate  Airway patency: patent  Cardiovascular status: acceptable  Respiratory status: acceptable  Hydration status: acceptable  Postoperative Nausea and Vomiting: none        There were no known notable events for this encounter.

## 2025-07-17 RX ORDER — METOPROLOL TARTRATE 25 MG/1
25 TABLET, FILM COATED ORAL 2 TIMES DAILY
Qty: 180 TABLET | Refills: 1 | Status: SHIPPED | OUTPATIENT
Start: 2025-07-17 | End: 2026-01-13

## 2025-07-18 ENCOUNTER — APPOINTMENT (OUTPATIENT)
Dept: UROLOGY | Facility: CLINIC | Age: 80
End: 2025-07-18
Payer: MEDICARE

## 2025-07-18 VITALS — DIASTOLIC BLOOD PRESSURE: 78 MMHG | SYSTOLIC BLOOD PRESSURE: 161 MMHG | HEART RATE: 81 BPM

## 2025-07-18 DIAGNOSIS — N32.81 OVERACTIVE BLADDER: ICD-10-CM

## 2025-07-18 PROCEDURE — 99024 POSTOP FOLLOW-UP VISIT: CPT | Performed by: UROLOGY

## 2025-07-18 NOTE — PROGRESS NOTES
Pt presented for catheter removal. Pt tolerated catheter removal well. Advised pt and daughter of normal/abnormal signs and symptoms of urinary retention and when to seek provider or ER care. Pt and daughter stated understanding.

## 2025-07-23 ENCOUNTER — TELEPHONE (OUTPATIENT)
Dept: UROLOGY | Facility: HOSPITAL | Age: 80
End: 2025-07-23
Payer: MEDICARE

## 2025-07-23 ENCOUNTER — HOSPITAL ENCOUNTER (EMERGENCY)
Facility: HOSPITAL | Age: 80
Discharge: HOME | End: 2025-07-23
Attending: STUDENT IN AN ORGANIZED HEALTH CARE EDUCATION/TRAINING PROGRAM
Payer: MEDICARE

## 2025-07-23 VITALS
SYSTOLIC BLOOD PRESSURE: 144 MMHG | HEIGHT: 68 IN | OXYGEN SATURATION: 96 % | DIASTOLIC BLOOD PRESSURE: 69 MMHG | HEART RATE: 72 BPM | TEMPERATURE: 97.9 F | RESPIRATION RATE: 16 BRPM | BODY MASS INDEX: 26.67 KG/M2 | WEIGHT: 176 LBS

## 2025-07-23 DIAGNOSIS — R33.9 URINARY RETENTION: Primary | ICD-10-CM

## 2025-07-23 DIAGNOSIS — R31.9 HEMATURIA, UNSPECIFIED TYPE: ICD-10-CM

## 2025-07-23 PROCEDURE — 99283 EMERGENCY DEPT VISIT LOW MDM: CPT

## 2025-07-23 PROCEDURE — 2500000001 HC RX 250 WO HCPCS SELF ADMINISTERED DRUGS (ALT 637 FOR MEDICARE OP)

## 2025-07-23 PROCEDURE — 99283 EMERGENCY DEPT VISIT LOW MDM: CPT | Performed by: STUDENT IN AN ORGANIZED HEALTH CARE EDUCATION/TRAINING PROGRAM

## 2025-07-23 PROCEDURE — 51702 INSERT TEMP BLADDER CATH: CPT

## 2025-07-23 RX ORDER — ACETAMINOPHEN 325 MG/1
650 TABLET ORAL ONCE
Status: COMPLETED | OUTPATIENT
Start: 2025-07-23 | End: 2025-07-23

## 2025-07-23 RX ADMIN — ACETAMINOPHEN 650 MG: 325 TABLET ORAL at 22:02

## 2025-07-23 ASSESSMENT — LIFESTYLE VARIABLES
EVER FELT BAD OR GUILTY ABOUT YOUR DRINKING: NO
EVER HAD A DRINK FIRST THING IN THE MORNING TO STEADY YOUR NERVES TO GET RID OF A HANGOVER: NO
HAVE YOU EVER FELT YOU SHOULD CUT DOWN ON YOUR DRINKING: NO
TOTAL SCORE: 0
HAVE PEOPLE ANNOYED YOU BY CRITICIZING YOUR DRINKING: NO

## 2025-07-23 ASSESSMENT — PAIN DESCRIPTION - LOCATION: LOCATION: BACK

## 2025-07-23 ASSESSMENT — PAIN - FUNCTIONAL ASSESSMENT: PAIN_FUNCTIONAL_ASSESSMENT: 0-10

## 2025-07-23 ASSESSMENT — PAIN DESCRIPTION - PAIN TYPE: TYPE: CHRONIC PAIN

## 2025-07-23 ASSESSMENT — PAIN SCALES - GENERAL
PAINLEVEL_OUTOF10: 4
PAINLEVEL_OUTOF10: 0 - NO PAIN
PAINLEVEL_OUTOF10: 0 - NO PAIN

## 2025-07-23 NOTE — TELEPHONE ENCOUNTER
TELEPHONE ENCOUNTER  UROLOGY SERVICE    REASON TO CALL: Hematuria      LAST UROLOGICAL VISIT: Swanson removed 7/18/25      PRESENT CONCERN     Patients's daughter called regarding concern that patient hasn't been able to void since 3pm and he is dripping bright blood red from meatus.       Patient Active Problem List    Diagnosis Date Noted    Anemia 07/14/2025    Hearing loss 07/14/2025    Diabetes mellitus, type 2 (Multi) 07/14/2025    Feels cold 05/30/2025    Chronic anticoagulation 05/30/2025    Muscle spasm 12/05/2024    Muscle weakness 12/05/2024    Urinary urgency 12/05/2024    Urge incontinence of urine 12/05/2024    Paroxysmal atrial fibrillation (Multi) 12/03/2024    Confusion 11/12/2024    Chronic back pain 07/01/2024    S/P CABG x 3 07/01/2024    Spinal stenosis, lumbar region, with neurogenic claudication 06/11/2024    Essential hypertension 07/13/2023    Atherosclerosis of coronary artery of native heart without angina pectoris 07/13/2023    Diabetic peripheral neuropathy associated with type 2 diabetes mellitus 07/13/2023    Essential tremor 07/13/2023    GERD without esophagitis 07/13/2023    Hyperplasia of prostate without lower urinary tract symptoms (LUTS) 07/13/2023    Major depressive disorder, single episode, moderate (Multi) 07/13/2023    Mixed hyperlipidemia 07/13/2023    Stage 3a chronic kidney disease (Multi) 07/13/2023    Lumbosacral spondylosis without myelopathy 09/06/2022    BPH with lower urinary tract symptoms without urinary obstruction 07/01/2025    Overactive bladder 07/01/2025     RX Allergies[1]      Plan  We advised patient and patient daughter to go immediately for urgent care to get physical evaluation.     Plan discussed with Chief of Urology Dr Rene Murillo MD       [1] No Known Allergies

## 2025-07-23 NOTE — ED TRIAGE NOTES
Patient had green light therapy done on prostate on Tuesday 07/15/2025. Patient had estrada removed on Friday. Around 0300 today, patient has been having difficulty urinating. Patient reports what he is urinating is just blood.

## 2025-07-23 NOTE — ED PROVIDER NOTES
HPI   Chief Complaint   Patient presents with    Difficulty Urinating    Blood in Urine       HPI    Edis Vogt is a 80-year-old male with history of hypertension, type 2 diabetes, BPH s/p greenlight laser procedure 7/15/2025, overactive bladder s/p botulism toxin injection 7/15/2025, A-fib on Eliquis, hyperlipidemia presenting the ED with urinary retention.  Patient states he had a Swanson placed on July 15 after he had the procedure and the Swanson was removed on Friday last week.  Patient states he did not have difficulties with urination until this morning.  Patient states he has not urinated since 3 AM this morning.  Patient states he has increased frequency however only drops of blood come out when he tries to pee.  Patient states it is painful when he tries to urinate.  Patient states he has lower abdominal pain.  Patient denies chest pain, shortness of breath, fevers, body aches, chills, nausea, vomiting.    Patient History   Medical History[1]  Surgical History[2]  Family History[3]  Social History[4]    Physical Exam   ED Triage Vitals [07/23/25 1758]   Temperature Heart Rate Respirations BP   36.6 °C (97.9 °F) 78 18 122/58      Pulse Ox Temp Source Heart Rate Source Patient Position   95 % Temporal Monitor Sitting      BP Location FiO2 (%)     Right arm --       Physical Exam  Vitals and nursing note reviewed.   Constitutional:       Appearance: Normal appearance.     Cardiovascular:      Rate and Rhythm: Normal rate and regular rhythm.      Heart sounds: Normal heart sounds. No murmur heard.     No gallop.   Pulmonary:      Effort: Pulmonary effort is normal. No respiratory distress.      Breath sounds: Normal breath sounds. No stridor. No wheezing, rhonchi or rales.   Abdominal:      General: Abdomen is flat. There is distension.      Palpations: Abdomen is soft. There is no mass.      Tenderness: There is no abdominal tenderness. There is no guarding or rebound.      Hernia: No hernia is present.       Comments: Suprapubic distention     Musculoskeletal:      Right lower leg: No edema.      Left lower leg: No edema.     Skin:     General: Skin is warm and dry.     Neurological:      General: No focal deficit present.      Mental Status: He is alert and oriented to person, place, and time.     Psychiatric:         Mood and Affect: Mood normal.         Behavior: Behavior normal.           ED Course & MDM   Diagnoses as of 07/23/25 2226   Urinary retention   Hematuria, unspecified type                 No data recorded     [place] Coma Scale Score: 15 (07/23/25 1849 : Naga Dukes RN)                           Medical Decision Making    This is a 80-year-old male presenting the ED with urinary retention.  Patient states he has not urinated since 3 AM this morning.  Patient states he has increased urinary frequency but only drops of urine comes out when he tries to urinate.  On exam abdomen is soft and nontender but patient does have suprapubic distention.  A Swanson catheter was placed without difficulty and had a significant amount of bloody urine output.  Given the significant blood in the urine plan will be to use push irrigation to remove blood and blood clots.  If the post irrigation does not improve the hematuria then the patient will need a three-way Swanson catheter.  Patient has been hemodynamically stable in the emergency department today.    Patient was discussed and staffed with Dr. Newton  Patient was discussed and signout to the oncoming emergency medicine provider.  Reevaluation after push irrigation and his pain at the time of signout.  Procedure  Procedures         [1]   Past Medical History:  Diagnosis Date    Arrhythmia     a-fib    Atrial fibrillation (Multi)     BPH (benign prostatic hyperplasia)     CKD (chronic kidney disease)     stage 3a    Coronary artery disease     Dementia     Depression     Dizziness     Erectile dysfunction 2020    GERD (gastroesophageal reflux disease)     Hearing aid  worn     HL (hearing loss)     Hyperlipidemia     Hypertension 2014    Lumbar disc disease     lumbosacral spondylethesis    Nocturia     OAB (overactive bladder)     Other chest pain 02/10/2022    Chest tightness    Other conditions influencing health status 01/06/2018    History of cough    Other hypotension 02/10/2022    Hypotension due to hypovolemia    Peripheral neuropathy     Personal history of other diseases of the circulatory system 12/30/2021    History of abnormal electrocardiography    Personal history of other diseases of the respiratory system 01/06/2018    History of influenza    Spinal stenosis     Type 2 diabetes mellitus     states currently taking no medications - diet controlled    Urinary frequency     Urinary incontinence 2024    Urinary urgency     Vertigo     Wears dentures     Wears glasses    [2]   Past Surgical History:  Procedure Laterality Date    APPENDECTOMY  ???    CARDIAC CATHETERIZATION      CATARACT EXTRACTION      COLONOSCOPY      CORONARY ARTERY BYPASS GRAFT      EYE SURGERY     [3] No family history on file.  [4]   Social History  Tobacco Use    Smoking status: Never    Smokeless tobacco: Current     Types: Chew   Vaping Use    Vaping status: Never Used   Substance Use Topics    Alcohol use: Not Currently     Comment: on holidays    Drug use: Never        Luis Cochran PA-C  07/23/25 1339

## 2025-07-24 ENCOUNTER — TELEPHONE (OUTPATIENT)
Dept: UROLOGY | Facility: CLINIC | Age: 80
End: 2025-07-24
Payer: MEDICARE

## 2025-07-24 NOTE — DISCHARGE INSTRUCTIONS
Follow-up with your urologist.  If symptoms worsen or new symptoms present return to the emergency department.

## 2025-07-24 NOTE — TELEPHONE ENCOUNTER
Daughter called said had procedure last week and he is having concerns, they went to the ER yesterday needs to speak to his nurse to see what is next step?  Please advise

## 2025-07-24 NOTE — PROGRESS NOTES
Emergency Department Transition of Care Note       Signout   I received Edis Vogt in signout from Luis JOHNSON.  Please see the ED Provider Note for all HPI, PE and MDM up to the time of signout at 2130.  This is in addition to the primary record.    In brief Edis Vogt is an 80 y.o. male presenting for urinary retention and hematuria.  Bladder scan is showed 650 mL. Swanson placed.  Bladder irrigated with normal saline.    At the time of signout we were awaiting:  Results of the bladder irrigation.    ED Course & Medical Decision Making   Medical Decision Making:  Under my care, working in collaboration with Dr. Alex Newton.  After irrigation of bladder, no clots notice and color improved and no gross blood.  Will discharge home with education to follow-up with urologist.    ED Course:  Diagnoses as of 07/23/25 2227   Urinary retention   Hematuria, unspecified type       Disposition   Discharge    Procedures   Procedures    This was a shared visit with an ED attending.  The patient was seen and discussed with the ED attending Dr. Alex Newton.    Joanie Arrieta, APRN-CNP  Emergency Medicine

## 2025-07-29 ENCOUNTER — APPOINTMENT (OUTPATIENT)
Dept: UROLOGY | Facility: CLINIC | Age: 80
End: 2025-07-29
Payer: MEDICARE

## 2025-07-29 VITALS — HEART RATE: 91 BPM | DIASTOLIC BLOOD PRESSURE: 69 MMHG | SYSTOLIC BLOOD PRESSURE: 133 MMHG

## 2025-07-29 DIAGNOSIS — R33.9 URINARY RETENTION: ICD-10-CM

## 2025-07-29 PROCEDURE — 51703 INSERT BLADDER CATH COMPLEX: CPT | Performed by: NURSE PRACTITIONER

## 2025-07-29 PROCEDURE — 99211 OFF/OP EST MAY X REQ PHY/QHP: CPT | Performed by: NURSE PRACTITIONER

## 2025-07-29 NOTE — PROGRESS NOTES
Patient ID: Edis Vogt is a 80 y.o. male.    Bladder Catheterization    Date/Time: 7/29/2025 3:16 PM    Performed by: Laurence Hansen LPN  Authorized by: CHUNG Jaimes-CNP    Procedure Details    Procedure: catheter insertion      Catheter insertion: indwelling      Catheter type: coudé      Catheter size: 16 Fr    Catheter provided by: health care [employer]      Complexity: complex      Number of initial attempts: 1    Urine volume (mL): 350    Urine characteristics: yellow    Balloon inflation amount (mL): 10    Post-Procedure Details     Outcome: patient tolerated procedure well with no complications        Pt returned to clinic stating that he was not urinating normally. Pt requested a PVR check. Pt attempted to urinate in office. Pt could not urinate in office. Pt tolerated PVR check well. Advised pt that due to PVR check, a estrada catheter was needed due to signs of urinary retention. Pt stated understanding.

## 2025-08-01 ENCOUNTER — CLINICAL SUPPORT (OUTPATIENT)
Dept: UROLOGY | Facility: CLINIC | Age: 80
End: 2025-08-01
Payer: MEDICARE

## 2025-08-01 VITALS — HEART RATE: 79 BPM | SYSTOLIC BLOOD PRESSURE: 147 MMHG | DIASTOLIC BLOOD PRESSURE: 75 MMHG

## 2025-08-01 DIAGNOSIS — R33.9 URINARY RETENTION: ICD-10-CM

## 2025-08-01 PROCEDURE — 99211 OFF/OP EST MAY X REQ PHY/QHP: CPT | Performed by: NURSE PRACTITIONER

## 2025-08-01 NOTE — PROGRESS NOTES
Patient ID: Edis Vogt is a 80 y.o. male.    Bladder Catheterization    Date/Time: 8/1/2025 8:44 AM    Performed by: Laurence Hansen LPN  Authorized by: CHUNG Jaimes-CNP    Procedure Details    Procedure: catheter insertion      Catheter insertion: indwelling      Catheter type: coudé      Catheter size: 16 Fr    Catheter provided by: health care organization      Complexity: complex      Number of initial attempts: 1    Urine characteristics: clear and yellow    Balloon inflation amount (mL): 10    Post-Procedure Details     Outcome: patient tolerated procedure well with no complications        Pt presented for trial of void and catheter removal. Pt did not tolerate trial of void well. Pt tolerated catheter removal well. Advised pt and daughter of need for catheter due to failing trial of void. Pt and daughter stated understanding. Pt to follow up with Dr Antunez later in the month.

## 2025-08-04 DIAGNOSIS — I48.0 PAROXYSMAL ATRIAL FIBRILLATION (MULTI): ICD-10-CM

## 2025-08-15 ENCOUNTER — APPOINTMENT (OUTPATIENT)
Dept: UROLOGY | Facility: CLINIC | Age: 80
End: 2025-08-15
Payer: MEDICARE

## 2025-08-15 VITALS — DIASTOLIC BLOOD PRESSURE: 76 MMHG | HEART RATE: 61 BPM | SYSTOLIC BLOOD PRESSURE: 161 MMHG | TEMPERATURE: 96 F

## 2025-08-15 DIAGNOSIS — N39.0 COMPLICATED UTI (URINARY TRACT INFECTION): Primary | ICD-10-CM

## 2025-08-15 DIAGNOSIS — R33.9 URINARY RETENTION: ICD-10-CM

## 2025-08-15 PROCEDURE — 1159F MED LIST DOCD IN RCRD: CPT | Performed by: UROLOGY

## 2025-08-15 PROCEDURE — 99213 OFFICE O/P EST LOW 20 MIN: CPT | Performed by: UROLOGY

## 2025-08-15 PROCEDURE — 1160F RVW MEDS BY RX/DR IN RCRD: CPT | Performed by: UROLOGY

## 2025-08-15 PROCEDURE — 3078F DIAST BP <80 MM HG: CPT | Performed by: UROLOGY

## 2025-08-15 PROCEDURE — 3077F SYST BP >= 140 MM HG: CPT | Performed by: UROLOGY

## 2025-08-15 RX ORDER — CEPHALEXIN 500 MG/1
500 CAPSULE ORAL 3 TIMES DAILY
Qty: 9 CAPSULE | Refills: 0 | Status: SHIPPED | OUTPATIENT
Start: 2025-08-15 | End: 2025-08-18

## 2025-08-15 ASSESSMENT — ENCOUNTER SYMPTOMS
DEPRESSION: 0
OCCASIONAL FEELINGS OF UNSTEADINESS: 0
LOSS OF SENSATION IN FEET: 0

## 2025-08-19 LAB
ANION GAP SERPL CALCULATED.4IONS-SCNC: 12 MMOL/L (CALC) (ref 7–17)
BUN SERPL-MCNC: 15 MG/DL (ref 7–25)
BUN/CREAT SERPL: 9 (CALC) (ref 6–22)
CALCIUM SERPL-MCNC: 8.9 MG/DL (ref 8.6–10.3)
CHLORIDE SERPL-SCNC: 106 MMOL/L (ref 98–110)
CO2 SERPL-SCNC: 23 MMOL/L (ref 20–32)
CREAT SERPL-MCNC: 1.59 MG/DL (ref 0.7–1.28)
EGFRCR SERPLBLD CKD-EPI 2021: 44 ML/MIN/1.73M2
GLUCOSE SERPL-MCNC: 185 MG/DL (ref 65–99)
POTASSIUM SERPL-SCNC: 3.9 MMOL/L (ref 3.5–5.3)
SODIUM SERPL-SCNC: 141 MMOL/L (ref 135–146)

## 2025-08-25 ENCOUNTER — APPOINTMENT (OUTPATIENT)
Dept: UROLOGY | Facility: CLINIC | Age: 80
End: 2025-08-25
Payer: MEDICARE

## 2025-11-17 ENCOUNTER — APPOINTMENT (OUTPATIENT)
Dept: UROLOGY | Facility: CLINIC | Age: 80
End: 2025-11-17
Payer: MEDICARE

## 2026-02-06 ENCOUNTER — APPOINTMENT (OUTPATIENT)
Dept: CARDIOLOGY | Facility: CLINIC | Age: 81
End: 2026-02-06
Payer: MEDICARE

## (undated) DEVICE — BAG, DRAINAGE, ANTI-REFLUX CHAMBER, 2000ML

## (undated) DEVICE — SOLUTION, SODIUM CHLORIDE 0.9%, 3000ML, BAG

## (undated) DEVICE — FIBER, XPS LASER

## (undated) DEVICE — BOWL, BASIN, 32 OZ, STERILE

## (undated) DEVICE — TRAY, MINOR, SINGLE BASIN, STERILE

## (undated) DEVICE — SOLUTION, IRRIGATION, USP, SODIUM CHLORIDE 0.9%, 3000 ML

## (undated) DEVICE — Device

## (undated) DEVICE — GLOVE, SURGICAL, PROTEXIS PI , 8.0, PF, LF

## (undated) DEVICE — SOLUTION, IRRIGATION, STERILE WATER, 1000 ML, HANG BOTTLE

## (undated) DEVICE — TUBING, SUCTION, 6MM X 10, CLEAN N-COND

## (undated) DEVICE — STRAP, ARM BOARD, 32 X 1.5

## (undated) DEVICE — CATHETER, FOLEY,  LUB HAMATURIA, 3 WAY, 22FR, 30CC

## (undated) DEVICE — TRAY, SKIN SCRUB, WET PREP, WITH 4 COMPARMENT

## (undated) DEVICE — SYRINGE, 20 CC, LUER LOCK

## (undated) DEVICE — GOWN, SURGICAL, ROYAL SILK, XXL, STERILE

## (undated) DEVICE — HOLSTER, ELECTROSURGERY ACCESSORY, STERILE

## (undated) DEVICE — GLOVE, SURGICAL, PROTEXIS PI BLUE W/NEUTHERA, 8.0, PF, LF